# Patient Record
Sex: FEMALE | Race: BLACK OR AFRICAN AMERICAN | Employment: OTHER | URBAN - METROPOLITAN AREA
[De-identification: names, ages, dates, MRNs, and addresses within clinical notes are randomized per-mention and may not be internally consistent; named-entity substitution may affect disease eponyms.]

---

## 2017-01-31 ENCOUNTER — HOSPITAL ENCOUNTER (OUTPATIENT)
Dept: MAMMOGRAPHY | Age: 72
Discharge: HOME | End: 2017-01-31
Attending: FAMILY MEDICINE
Payer: MEDICARE

## 2017-01-31 DIAGNOSIS — Z12.31 VISIT FOR SCREENING MAMMOGRAM: ICD-10-CM

## 2017-01-31 PROCEDURE — 77063 BREAST TOMOSYNTHESIS BI: CPT

## 2017-12-05 ENCOUNTER — HOSPITAL ENCOUNTER (OUTPATIENT)
Dept: ULTRASOUND IMAGING | Age: 72
Discharge: HOME | End: 2017-12-05
Payer: MEDICARE

## 2017-12-05 DIAGNOSIS — E04.2 NONTOXIC MULTINODULAR GOITER: ICD-10-CM

## 2017-12-05 PROCEDURE — 76536 US EXAM OF HEAD AND NECK: CPT

## 2018-02-05 ENCOUNTER — HOSPITAL ENCOUNTER (OUTPATIENT)
Dept: MAMMOGRAPHY | Age: 73
Discharge: HOME | End: 2018-02-05
Attending: FAMILY MEDICINE
Payer: MEDICARE

## 2018-02-05 DIAGNOSIS — Z12.39 BREAST CANCER SCREENING: ICD-10-CM

## 2018-02-05 PROCEDURE — 77063 BREAST TOMOSYNTHESIS BI: CPT

## 2018-03-05 ENCOUNTER — HOSPITAL ENCOUNTER (INPATIENT)
Age: 73
LOS: 1 days | Discharge: HOME | DRG: 916 | End: 2018-03-06
Attending: EMERGENCY MEDICINE | Admitting: FAMILY MEDICINE
Payer: MEDICARE

## 2018-03-05 DIAGNOSIS — R49.9 CHANGE IN VOICE: ICD-10-CM

## 2018-03-05 DIAGNOSIS — T78.3XXA ANGIOEDEMA, INITIAL ENCOUNTER: Primary | ICD-10-CM

## 2018-03-05 LAB
ABO + RH BLD: NORMAL
ALBUMIN SERPL-MCNC: 3.9 G/DL (ref 3.4–5)
ALBUMIN/GLOB SERPL: 1.3 {RATIO} (ref 0.8–1.7)
ALP SERPL-CCNC: 69 U/L (ref 45–117)
ALT SERPL-CCNC: 27 U/L (ref 13–56)
ANION GAP SERPL CALC-SCNC: 6 MMOL/L (ref 3–18)
ANION GAP SERPL CALC-SCNC: 7 MMOL/L (ref 3–18)
AST SERPL-CCNC: 22 U/L (ref 15–37)
BASOPHILS # BLD: 0 K/UL (ref 0–0.06)
BASOPHILS # BLD: 0 K/UL (ref 0–0.06)
BASOPHILS NFR BLD: 0 % (ref 0–2)
BASOPHILS NFR BLD: 0 % (ref 0–2)
BILIRUB SERPL-MCNC: 0.4 MG/DL (ref 0.2–1)
BLOOD GROUP ANTIBODIES SERPL: NORMAL
BUN SERPL-MCNC: 9 MG/DL (ref 7–18)
BUN SERPL-MCNC: 9 MG/DL (ref 7–18)
BUN/CREAT SERPL: 8 (ref 12–20)
BUN/CREAT SERPL: 9 (ref 12–20)
CALCIUM SERPL-MCNC: 9.3 MG/DL (ref 8.5–10.1)
CALCIUM SERPL-MCNC: 9.7 MG/DL (ref 8.5–10.1)
CHLORIDE SERPL-SCNC: 106 MMOL/L (ref 100–108)
CHLORIDE SERPL-SCNC: 107 MMOL/L (ref 100–108)
CO2 SERPL-SCNC: 29 MMOL/L (ref 21–32)
CO2 SERPL-SCNC: 29 MMOL/L (ref 21–32)
CREAT SERPL-MCNC: 0.99 MG/DL (ref 0.6–1.3)
CREAT SERPL-MCNC: 1.11 MG/DL (ref 0.6–1.3)
DIFFERENTIAL METHOD BLD: ABNORMAL
DIFFERENTIAL METHOD BLD: ABNORMAL
EOSINOPHIL # BLD: 0 K/UL (ref 0–0.4)
EOSINOPHIL # BLD: 0.1 K/UL (ref 0–0.4)
EOSINOPHIL NFR BLD: 0 % (ref 0–5)
EOSINOPHIL NFR BLD: 2 % (ref 0–5)
ERYTHROCYTE [DISTWIDTH] IN BLOOD BY AUTOMATED COUNT: 14.9 % (ref 11.6–14.5)
ERYTHROCYTE [DISTWIDTH] IN BLOOD BY AUTOMATED COUNT: 14.9 % (ref 11.6–14.5)
EST. AVERAGE GLUCOSE BLD GHB EST-MCNC: 151 MG/DL
GLOBULIN SER CALC-MCNC: 3.1 G/DL (ref 2–4)
GLUCOSE BLD STRIP.AUTO-MCNC: 239 MG/DL (ref 70–110)
GLUCOSE SERPL-MCNC: 123 MG/DL (ref 74–99)
GLUCOSE SERPL-MCNC: 233 MG/DL (ref 74–99)
HBA1C MFR BLD: 6.9 % (ref 4.2–5.6)
HCT VFR BLD AUTO: 34.5 % (ref 35–45)
HCT VFR BLD AUTO: 39 % (ref 35–45)
HGB BLD-MCNC: 11.8 G/DL (ref 12–16)
HGB BLD-MCNC: 13.2 G/DL (ref 12–16)
INR PPP: 1 (ref 0.8–1.2)
LYMPHOCYTES # BLD: 1.3 K/UL (ref 0.9–3.6)
LYMPHOCYTES # BLD: 4.2 K/UL (ref 0.9–3.6)
LYMPHOCYTES NFR BLD: 21 % (ref 21–52)
LYMPHOCYTES NFR BLD: 61 % (ref 21–52)
MCH RBC QN AUTO: 28.9 PG (ref 24–34)
MCH RBC QN AUTO: 29.3 PG (ref 24–34)
MCHC RBC AUTO-ENTMCNC: 33.8 G/DL (ref 31–37)
MCHC RBC AUTO-ENTMCNC: 34.2 G/DL (ref 31–37)
MCV RBC AUTO: 85.3 FL (ref 74–97)
MCV RBC AUTO: 85.6 FL (ref 74–97)
MONOCYTES # BLD: 0.1 K/UL (ref 0.05–1.2)
MONOCYTES # BLD: 0.5 K/UL (ref 0.05–1.2)
MONOCYTES NFR BLD: 1 % (ref 3–10)
MONOCYTES NFR BLD: 7 % (ref 3–10)
NEUTS SEG # BLD: 2 K/UL (ref 1.8–8)
NEUTS SEG # BLD: 4.9 K/UL (ref 1.8–8)
NEUTS SEG NFR BLD: 30 % (ref 40–73)
NEUTS SEG NFR BLD: 78 % (ref 40–73)
PLATELET # BLD AUTO: 175 K/UL (ref 135–420)
PLATELET # BLD AUTO: 182 K/UL (ref 135–420)
PMV BLD AUTO: 11.1 FL (ref 9.2–11.8)
PMV BLD AUTO: 11.3 FL (ref 9.2–11.8)
POTASSIUM SERPL-SCNC: 3.4 MMOL/L (ref 3.5–5.5)
POTASSIUM SERPL-SCNC: 3.8 MMOL/L (ref 3.5–5.5)
PROT SERPL-MCNC: 7 G/DL (ref 6.4–8.2)
PROTHROMBIN TIME: 13 SEC (ref 11.5–15.2)
RBC # BLD AUTO: 4.03 M/UL (ref 4.2–5.3)
RBC # BLD AUTO: 4.57 M/UL (ref 4.2–5.3)
SODIUM SERPL-SCNC: 142 MMOL/L (ref 136–145)
SODIUM SERPL-SCNC: 142 MMOL/L (ref 136–145)
SPECIMEN EXP DATE BLD: NORMAL
WBC # BLD AUTO: 6.3 K/UL (ref 4.6–13.2)
WBC # BLD AUTO: 6.8 K/UL (ref 4.6–13.2)

## 2018-03-05 PROCEDURE — 96375 TX/PRO/DX INJ NEW DRUG ADDON: CPT

## 2018-03-05 PROCEDURE — 74011250636 HC RX REV CODE- 250/636: Performed by: EMERGENCY MEDICINE

## 2018-03-05 PROCEDURE — 85610 PROTHROMBIN TIME: CPT | Performed by: EMERGENCY MEDICINE

## 2018-03-05 PROCEDURE — 74011636637 HC RX REV CODE- 636/637: Performed by: FAMILY MEDICINE

## 2018-03-05 PROCEDURE — 74011250637 HC RX REV CODE- 250/637: Performed by: HOSPITALIST

## 2018-03-05 PROCEDURE — 74011000250 HC RX REV CODE- 250: Performed by: FAMILY MEDICINE

## 2018-03-05 PROCEDURE — 86900 BLOOD TYPING SEROLOGIC ABO: CPT | Performed by: EMERGENCY MEDICINE

## 2018-03-05 PROCEDURE — 74011000250 HC RX REV CODE- 250: Performed by: EMERGENCY MEDICINE

## 2018-03-05 PROCEDURE — 65660000000 HC RM CCU STEPDOWN

## 2018-03-05 PROCEDURE — 80053 COMPREHEN METABOLIC PANEL: CPT | Performed by: FAMILY MEDICINE

## 2018-03-05 PROCEDURE — 94761 N-INVAS EAR/PLS OXIMETRY MLT: CPT

## 2018-03-05 PROCEDURE — 96374 THER/PROPH/DIAG INJ IV PUSH: CPT

## 2018-03-05 PROCEDURE — 82962 GLUCOSE BLOOD TEST: CPT

## 2018-03-05 PROCEDURE — 99284 EMERGENCY DEPT VISIT MOD MDM: CPT

## 2018-03-05 PROCEDURE — 36415 COLL VENOUS BLD VENIPUNCTURE: CPT | Performed by: FAMILY MEDICINE

## 2018-03-05 PROCEDURE — 77030011256 HC DRSG MEPILEX <16IN NO BORD MOLN -A

## 2018-03-05 PROCEDURE — 83036 HEMOGLOBIN GLYCOSYLATED A1C: CPT | Performed by: FAMILY MEDICINE

## 2018-03-05 PROCEDURE — 94640 AIRWAY INHALATION TREATMENT: CPT

## 2018-03-05 PROCEDURE — P9059 PLASMA, FRZ BETWEEN 8-24HOUR: HCPCS | Performed by: EMERGENCY MEDICINE

## 2018-03-05 PROCEDURE — 80048 BASIC METABOLIC PNL TOTAL CA: CPT | Performed by: EMERGENCY MEDICINE

## 2018-03-05 PROCEDURE — 36430 TRANSFUSION BLD/BLD COMPNT: CPT

## 2018-03-05 PROCEDURE — 30233K1 TRANSFUSION OF NONAUTOLOGOUS FROZEN PLASMA INTO PERIPHERAL VEIN, PERCUTANEOUS APPROACH: ICD-10-PCS | Performed by: EMERGENCY MEDICINE

## 2018-03-05 PROCEDURE — 85025 COMPLETE CBC W/AUTO DIFF WBC: CPT | Performed by: EMERGENCY MEDICINE

## 2018-03-05 RX ORDER — AMLODIPINE BESYLATE 5 MG/1
10 TABLET ORAL DAILY
Status: DISCONTINUED | OUTPATIENT
Start: 2018-03-06 | End: 2018-03-06 | Stop reason: HOSPADM

## 2018-03-05 RX ORDER — GABAPENTIN 600 MG/1
600 TABLET ORAL DAILY
COMMUNITY

## 2018-03-05 RX ORDER — SODIUM CHLORIDE 9 MG/ML
250 INJECTION, SOLUTION INTRAVENOUS AS NEEDED
Status: DISCONTINUED | OUTPATIENT
Start: 2018-03-05 | End: 2018-03-06 | Stop reason: HOSPADM

## 2018-03-05 RX ORDER — CLONAZEPAM 0.5 MG/1
0.5 TABLET ORAL 2 TIMES DAILY
COMMUNITY

## 2018-03-05 RX ORDER — INSULIN LISPRO 100 [IU]/ML
INJECTION, SOLUTION INTRAVENOUS; SUBCUTANEOUS
Status: DISCONTINUED | OUTPATIENT
Start: 2018-03-05 | End: 2018-03-06 | Stop reason: HOSPADM

## 2018-03-05 RX ORDER — INSULIN LISPRO 100 [IU]/ML
INJECTION, SOLUTION INTRAVENOUS; SUBCUTANEOUS EVERY 6 HOURS
Status: DISCONTINUED | OUTPATIENT
Start: 2018-03-05 | End: 2018-03-05

## 2018-03-05 RX ORDER — ZOLPIDEM TARTRATE 5 MG/1
10 TABLET ORAL
Status: DISCONTINUED | OUTPATIENT
Start: 2018-03-05 | End: 2018-03-06 | Stop reason: HOSPADM

## 2018-03-05 RX ORDER — DIPHENHYDRAMINE HYDROCHLORIDE 50 MG/ML
50 INJECTION, SOLUTION INTRAMUSCULAR; INTRAVENOUS
Status: COMPLETED | OUTPATIENT
Start: 2018-03-05 | End: 2018-03-05

## 2018-03-05 RX ORDER — CELECOXIB 200 MG/1
200 CAPSULE ORAL DAILY
COMMUNITY

## 2018-03-05 RX ORDER — ENOXAPARIN SODIUM 100 MG/ML
40 INJECTION SUBCUTANEOUS EVERY 24 HOURS
Status: DISCONTINUED | OUTPATIENT
Start: 2018-03-05 | End: 2018-03-06 | Stop reason: HOSPADM

## 2018-03-05 RX ORDER — FAMOTIDINE 10 MG/ML
20 INJECTION INTRAVENOUS
Status: COMPLETED | OUTPATIENT
Start: 2018-03-05 | End: 2018-03-05

## 2018-03-05 RX ORDER — TERBINAFINE HYDROCHLORIDE 250 MG/1
250 TABLET ORAL DAILY
COMMUNITY

## 2018-03-05 RX ORDER — POTASSIUM CHLORIDE 7.45 MG/ML
10 INJECTION INTRAVENOUS
Status: COMPLETED | OUTPATIENT
Start: 2018-03-05 | End: 2018-03-05

## 2018-03-05 RX ORDER — SODIUM CHLORIDE AND POTASSIUM CHLORIDE .9; .15 G/100ML; G/100ML
SOLUTION INTRAVENOUS CONTINUOUS
Status: DISCONTINUED | OUTPATIENT
Start: 2018-03-05 | End: 2018-03-05

## 2018-03-05 RX ORDER — MAGNESIUM SULFATE 100 %
4 CRYSTALS MISCELLANEOUS AS NEEDED
Status: DISCONTINUED | OUTPATIENT
Start: 2018-03-05 | End: 2018-03-06 | Stop reason: HOSPADM

## 2018-03-05 RX ORDER — DEXTROSE 50 % IN WATER (D50W) INTRAVENOUS SYRINGE
25-50 AS NEEDED
Status: DISCONTINUED | OUTPATIENT
Start: 2018-03-05 | End: 2018-03-06 | Stop reason: HOSPADM

## 2018-03-05 RX ADMIN — METHYLPREDNISOLONE SODIUM SUCCINATE 125 MG: 125 INJECTION, POWDER, FOR SOLUTION INTRAMUSCULAR; INTRAVENOUS at 04:21

## 2018-03-05 RX ADMIN — POTASSIUM CHLORIDE 10 MEQ: 7.46 INJECTION, SOLUTION INTRAVENOUS at 07:23

## 2018-03-05 RX ADMIN — FAMOTIDINE 20 MG: 10 INJECTION, SOLUTION INTRAVENOUS at 18:30

## 2018-03-05 RX ADMIN — INSULIN LISPRO 4 UNITS: 100 INJECTION, SOLUTION INTRAVENOUS; SUBCUTANEOUS at 18:30

## 2018-03-05 RX ADMIN — ZOLPIDEM TARTRATE 10 MG: 5 TABLET ORAL at 22:54

## 2018-03-05 RX ADMIN — FAMOTIDINE 20 MG: 10 INJECTION, SOLUTION INTRAVENOUS at 04:21

## 2018-03-05 RX ADMIN — DIPHENHYDRAMINE HYDROCHLORIDE 50 MG: 50 INJECTION, SOLUTION INTRAMUSCULAR; INTRAVENOUS at 04:20

## 2018-03-05 RX ADMIN — RACEPINEPHRINE HYDROCHLORIDE 0.5 ML: 11.25 SOLUTION RESPIRATORY (INHALATION) at 04:30

## 2018-03-05 NOTE — PROGRESS NOTES
[facility]   Discharge Planning/ Assessment    Reasons for Intervention: Chart reviewed and pt verified demographics. Her daughter Carey Story [phone] is her emergency contact. A gentleman was in her room who identified himself as her son. She asked him to wait outside the room while she talked with me. She did not want to add this son to her list of contacts. She has [place] part a and [place], and [place] for insurance. Dr Lisset Garcia is her PCP and she saw him last month. She goes to a pain specialist and was due for tomorrow. She will reschedule that meeting and says she will get a shot for pain when she goes. She had received Home Health Pt aboout 2 years ago for her knee surgery. She currently goes to [facility] on [address] Tuesday and Fridays by bus for therapy. She has a nebulizer, walker, wheel chair and cane for mobility. She says she is having a harder time getting things done such as shopping and chores. Her transition plan is home, with her dtr or her friend to transport. If she continues to have issues with her shopping and chores, she will discuss with Dr Dana Vann.       High Risk Criteria  [x] Yes  []No   Physician Referral  [] Yes  []No        Date    Nursing Referral  [] Yes  []No        Date    Patient/Family Request  [] Yes  []No        Date       Resources:    Medicare  [x] Yes  []No   Medicaid  [x] Yes  []No   No Resources  [] Yes  []No   Private Insurance  [] Yes  []No    Name/Phone Number    Other  [] Yes  []No        (i.e. Workman's Comp)         Prior Services:    Prior Services  [] Yes  []No   Home Health  [x] Yes  []No   [address]  [] Yes  []No        Number of [address]  [] Yes  []No       Meals on Wheels  [] Yes  []No   Office on Aging  [] Yes  []No   Transportation Services  [] Yes  []No   [address]  [] Yes  []No        Nursing Home Name    [address]  [] Yes  []No        [address] Name    Other Information Source:      Information obtained from  [x] Patient  [] Parent   [] Guardian  [] Child  [] Spouse   [] Significant Other/Partner   [] Friend      [] EMS    [] Nursing Home Chart          [] Other:   Chart Review  [x] Yes  []No     Family/Support System:    Patient lives with  [x] Alone    [] Spouse   [] Significant Other  [] Children  [] Caretaker   [] Parent  [] Sibling     [] Other       Other Support System:    Is the patient responsible for care of others  [] Yes  [x]No   Information of person caring for patient on  discharge    Managers financial affairs independently  [x] Yes  []No   If no, explain:      Status Prior to Admission:    Mental Status  [x] Awake  [] Alert  [] Oriented  [] Quiet/Calm [] Lethargic/Sedated   [] Disoriented  [] Restless/Anxious  [] Combative   Personal Care  [] Dependent  [x] [address]  [] Requires Assistance   Meal Preparation Ability  [x] Independent   [] Standby Assistance   [] Minimal Assistance   [] Moderate Assistance  [] Maximum Assistance     [] Total Assistance   Chores  [x] Independent with Chores   [] N/A Nursing Home Resident   [] Requires Assistance   Bowel/Bladder  [x] Continent  [] Catheter  [] Incontinent  [] Ostomy Self-Care    [] Urine Diversion Self-Care  [] Maximum Assistance     [] Total Assistance   Number of Persons needed for assistance    DME at home  [] [address] [place] [address], Ne, Devonda Andra  [] [address] [place] [address], Straight   [] Commode    [] Bathroom/Grab Bars  [] Hospital Bed  [] Nebulizer  [] Oxygen           [] Raised Toilet Seat  [] Shower Chair  [] Side Rails for Bed   [] Tub Transfer Bench   [] [place]  [] Joey [place], Standard      [] Other:   Vendor      Treatment Presently Receiving:    Current Treatments  [] Chemotherapy  [] Dialysis  [] Insulin  [] IVAB [x] IVF   [] O2  [] PCA   [] PT   [] RT   [] Tube Feedings   [] Wound Care     Psychosocial Evaluation:    Verbalized Knowledge of Disease Process  [] Patient  []Family   Coping with Disease Process  [] Patient  []Family   Requires Further Counseling Coping with Disease Process  [] Patient  []Family     Identified Projected Needs:    Home Health Aid  [] Yes  []No   Transportation  [] Yes  []No   Education  [] Yes  []No        Specific Education     Financial Counseling  [] Yes  []No   Inability to Care for Self/Will Require 24 hour care  [] Yes  []No   Pain Management  [] Yes  []No   Home Infusion Therapy  [] Yes  []No   Oxygen Therapy  [] Yes  []No   DME  [] Yes  []No   Long Term Care Placement  [] Yes  []No   Rehab  [] Yes  []No   Physical Therapy  [] Yes  []No   Needs Anticipated At This Time  [] Yes  [x]No     Intra-Hospital Referral:    [address] [facility]  [] Yes  []No     [] Yes  []No   Patient Representative  [] Yes  []No   Staff for Teaching Needs  [] Yes  []No   Specialty Teaching Needs     Diabetic Educator  [] Yes  []No   Referral for Diabetic Educator Needed  [] Yes  []No  If Yes, place order for Nutritionist or Diabetic Consult     Tentative Discharge Plan:    Home with No Services  [x] Yes  []No   Home with Home Health Follow-up  [] Yes  []No        If Yes, specify type    Home Care Program  [] Yes  []No        If Yes, specify type    Meals on Wheels  [] Yes  []No   Office of Aging  [] Yes  []No   NHP  [] Yes  []No   Return to the Nursing Home  [] Yes  []No   Rehab Therapy  [] Yes  []No   Acute Rehab  [] Yes  []No   Subacute Rehab  [] Yes  []No   Private Care  [] Yes  []No   Substance Abuse Referral  [] Yes  []No   Transportation  [] Yes  []No   Chore Service  [] Yes  []No   Inpatient Hospice  [] Yes  []No   OP RT  [] Yes  [] No   OP Hemo  [] Yes  [] No   OP PT  [] Yes  []No   Support Group  [] Yes  []No   Reach to Recovery  [] Yes  []No   OP Oncology Clinic  [] Yes  []No   Clinic Appointment  [] Yes  []No   DME  [] Yes  []No   Comments    Name of D/C Planner or  Given to Patient or Family [facility].  [facility] RN   Phone Number         Extension [phone]   Date 03/05/18   Time    If you are discharged home, whom do you designate to participate in your discharge plan and receive any information needed?      Enter name of designee dtr        Phone # of designee         Address of designee         Updated         Patient refused to designate any           individual

## 2018-03-05 NOTE — PROGRESS NOTES
completed the initial Spiritual Assessment of the patient in bed 6 of the emergency room and offered Pastoral Care support to her and family members present. Patient does not have any Church/cultural needs that will affect patients preferences in health care.  Chaplains will continue to follow and will provide pastoral care on an as needed/requested basis     Chaplain Son Oh   Board Certified [address] [place] [address]   [phone]

## 2018-03-05 NOTE — ACP (ADVANCE CARE PLANNING)
Patient has designated ______a daughter or her friend __________________ to participate in his/her discharge plan and to receive any needed information. Name: one daughter is Laurent Zaldivar [phone] and pt wants to keep her as emergency contact.    Address:  Phone number:

## 2018-03-05 NOTE — ED NOTES
Received bedside report, patient resting in the bed, on the cardiac monitor, family at the bedside, night nurse to call report and then patient will go upstairs, Patient is A&O x 4

## 2018-03-05 NOTE — ED NOTES
Lip swelling is greatly improved from time of admission. Patient continues to denies SOB, difficulty swallowing, denies throat tightness.

## 2018-03-05 NOTE — ED TRIAGE NOTES
Patient awoke with swelling of lips and tongue. Has change of voice. Denies difficulty breathing at this time.

## 2018-03-05 NOTE — IP AVS SNAPSHOT
[address] [address] 
[phone] Patient: Jackie Amaya MRN: [id number] :1945 About your hospitalization You were admitted on:  2018 You last received care in the:  [address] [place] [address] You were discharged on:  2018 Why you were hospitalized Your primary diagnosis was: Angioedema Follow-up Information Follow up With Details Comments Contact Info Morenita Lynne MD  Follow up with PCP within 7 days of discharge Chantel [address] [id number] 
[phone] Discharge Orders None A check maria indicates which time of day the medication should be taken. My Medications CONTINUE taking these medications Instructions Each Dose to Equal  
 Morning Noon Evening Bedtime  
 allopurinol 300 mg tablet Commonly known as:  Yovani Nogueira Your last dose was: Your next dose is: Take 1 Tab by mouth daily. 300 mg  
    
   
   
   
  
 amLODIPine 10 mg tablet Commonly known as:  Nanette Anderson Your last dose was: Your next dose is: Take 1 Tab by mouth daily. 10 mg  
    
   
   
   
  
 celecoxib 200 mg capsule Commonly known as:  CELEBREX Your last dose was: Your next dose is: Take 200 mg by mouth daily. 200 mg  
    
   
   
   
  
 clonazePAM 0.5 mg tablet Commonly known as:  Tiney Player Your last dose was: Your next dose is: Take 0.5 mg by mouth two (2) times a day. 0.5 mg  
    
   
   
   
  
 cyclobenzaprine 10 mg tablet Commonly known as:  FLEXERIL Your last dose was: Your next dose is: Take 1 Tab by mouth two (2) times a day. 10 mg  
    
   
   
   
  
 gabapentin 600 mg tablet Commonly known as:  NEURONTIN Your last dose was: Your next dose is: Take 600 mg by mouth daily.   
 600 mg  
    
 HYDROmorphone 2 mg tablet Commonly known as:  DILAUDID Your last dose was: Your next dose is: Take 1 Tab by mouth every four (4) hours as needed. Max Daily Amount: 12 mg.  
 2 mg  
    
   
   
   
  
 metFORMIN 500 mg tablet Commonly known as:  GLUCOPHAGE Your last dose was: Your next dose is: Take 500 mg by mouth every evening. 500 mg  
    
   
   
   
  
 pantoprazole 40 mg tablet Commonly known as:  PROTONIX Your last dose was: Your next dose is: Take 1 Tab by mouth Daily (before breakfast). 40 mg  
    
   
   
   
  
 simvastatin 10 mg tablet Commonly known as:  ZOCOR Your last dose was: Your next dose is: Take 1 Tab by mouth nightly. 10 mg  
    
   
   
   
  
 terbinafine HCl 250 mg tablet Commonly known as:  LAMISIL Your last dose was: Your next dose is: Take 250 mg by mouth daily. 250 mg  
    
   
   
   
  
 therapeutic multivitamin tablet Commonly known as:  [facility] Your last dose was: Your next dose is: Take 1 Tab by mouth daily. 1 Tab  
    
   
   
   
  
 traMADol 50 mg tablet Commonly known as:  ULTRAM  
   
Your last dose was: Your next dose is: Take 50 mg by mouth every six (6) hours as needed for Pain. 50 mg  
    
   
   
   
  
 zolpidem 10 mg tablet Commonly known as:  AMBIEN Your last dose was: Your next dose is: Take 1 Tab by mouth nightly. Max Daily Amount: 10 mg.  
 10 mg  
    
   
   
   
  
  
STOP taking these medications   
 quinapril 40 mg tablet Commonly known as:  ACCUPRIL Discharge Instructions Allergic Reaction: Care Instructions Your Care Instructions An allergic reaction is an excessive response from your immune system to a medicine, chemical, food, insect bite, or other substance. A reaction can range from mild to life-threatening. Some people have a mild rash, hives, and itching or stomach cramps. In severe reactions, swelling of your tongue and throat can close up your airway so that you cannot breathe. Follow-up care is a key part of your treatment and safety. Be sure to make and go to all appointments, and call your doctor if you are having problems. It's also a good idea to know your test results and keep a list of the medicines you take. How can you care for yourself at home? · If you know what caused your allergic reaction, be sure to avoid it. Your allergy may become more severe each time you have a reaction. · Take an over-the-counter antihistamine, such as cetirizine (Zyrtec) or loratadine (Claritin), to treat mild symptoms. Read and follow directions on the label. Some antihistamines can make you feel sleepy. Do not give antihistamines to a child unless you have checked with your doctor first. Mild symptoms include sneezing or an itchy or runny nose; an itchy mouth; a few hives or mild itching; and mild nausea or stomach discomfort. · Do not scratch hives or a rash. Put a cold, moist towel on them or take cool baths to relieve itching. Put ice packs on hives, swelling, or insect stings for 10 to 15 minutes at a time. Put a thin cloth between the ice pack and your skin. Do not take hot baths or showers. They will make the itching worse. · Your doctor may prescribe a shot of epinephrine to carry with you in case you have a severe reaction. Learn how to give yourself the shot and keep it with you at all times. Make sure it is not . · Go to the emergency room every time you have a severe reaction, even if you have used your shot of epinephrine and are feeling better. Symptoms can come back after a shot. · Wear medical alert jewelry that lists your allergies. You can buy this at most [employer]. · If your child has a severe allergy, make sure that his or her teachers, babysitters, coaches, and other caregivers know about the allergy. They should have an epinephrine shot, know how and when to give it, and have a plan to take your child to the hospital. 
When should you call for help? Give an epinephrine shot if: 
? · You think you are having a severe allergic reaction. ? · You have symptoms in more than one body area, such as mild nausea and an itchy mouth. ? After giving an epinephrine shot call 911, even if you feel better. ?Call 911 if: 
? · You have symptoms of a severe allergic reaction. These may include: 
¨ Sudden raised, red areas (hives) all over your body. ¨ Swelling of the throat, mouth, lips, or tongue. ¨ Trouble breathing. ¨ Passing out (losing consciousness). Or you may feel very lightheaded or suddenly feel weak, confused, or restless. ? · You have been given an epinephrine shot, even if you feel better. ?Call your doctor now or seek immediate medical care if: 
? · You have symptoms of an allergic reaction, such as: ¨ A rash or hives (raised, red areas on the skin). ¨ Itching. ¨ Swelling. ¨ Belly pain, nausea, or vomiting. ? Watch closely for changes in your health, and be sure to contact your doctor if: 
? · You do not get better as expected. Where can you learn more? Go to http://alanis-mann.info/. Enter T547 in the search box to learn more about \"Allergic Reaction: Care Instructions. \" Current as of: September 29, 2016 Content Version: 11.4 © 5177-1732 [employer]. Care instructions adapted under license by [employer] (which disclaims liability or warranty for this information). If you have questions about a medical condition or this instruction, always ask your healthcare professional. [address] any warranty or liability for your use of this information. Angioedema: Care Instructions Your Care Instructions Angioedema is an allergic reaction. It causes swelling and welts in the deep layers of the skin. Angioedema can sometimes occur along with hives. Hives are an allergic reaction in the outer layers of the skin. Angioedema can range from mild to severe. Painful welts can develop on the face. Angioedema can also occur on other parts of the body. In severe cases, the inside of the throat can swell and make it hard to breathe. Many things can cause this condition, including foods, insect bites, and medicines (such as aspirin and some blood pressure medicines). It also can run in families. Sometimes you may know what caused the reaction, but other times you may not know. Follow-up care is a key part of your treatment and safety. Be sure to make and go to all appointments, and call your doctor if you are having problems. It's also a good idea to know your test results and keep a list of the medicines you take. How can you care for yourself at home? · Take your medicines exactly as prescribed. Call your doctor if you think you are having a problem with your medicine. You will get more details on the specific medicines your doctor prescribes. Some medicines used to treat angioedema can make you too sleepy to drive safely. Do not drive if you take medicine that may make you sleepy. · Avoid foods or medicine that may have triggered the swelling. · For comfort: ¨ Try taking a cool bath. Or place a cool, wet towel on the swollen area. ¨ Avoid hot baths and showers. ¨ Wear loose clothing. · Your doctor may prescribe a shot of epinephrine to carry with you in case you have a severe reaction. Learn how to give yourself the shot and keep it with you at all times. Make sure it has not . When should you call for help? Give an epinephrine shot if: 
? · You think you are having a severe allergic reaction. ? After giving an epinephrine shot call 911, even if you feel better. ?Call 911 if: ? · You have symptoms of a severe allergic reaction. These may include: 
¨ Sudden raised, red areas (hives) all over your body. ¨ Swelling of the throat, mouth, lips, or tongue. ¨ Trouble breathing. ¨ Passing out (losing consciousness). Or you may feel very lightheaded or suddenly feel weak, confused, or restless. ? · You have been given an epinephrine shot, even if you feel better. ?Call your doctor now or seek immediate medical care if: 
? · You have symptoms of an allergic reaction, such as: ¨ A rash or hives (raised, red areas on the skin). ¨ Itching. ¨ Swelling. ¨ Belly pain, nausea, or vomiting. ? Watch closely for changes in your health, and be sure to contact your doctor if: 
? · You do not get better as expected. Where can you learn more? Go to http://alanis490 Entertainmentmann.info/. Enter Y305 in the search box to learn more about \"Angioedema: Care Instructions. \" Current as of: September 29, 2016 Content Version: 11.4 © 3908-0755 [employer]. Care instructions adapted under license by [employer] (which disclaims liability or warranty for this information). If you have questions about a medical condition or this instruction, always ask your healthcare professional. [address] any warranty or liability for your use of this information. DISCHARGE SUMMARY from Nurse PATIENT INSTRUCTIONS: 
 
 
F-face looks uneven A-arms unable to move or move unevenly S-speech slurred or non-existent T-time-call 911 as soon as signs and symptoms begin-DO NOT go  
 Back to bed or wait to see if you get better-TIME IS BRAIN. Warning Signs of HEART ATTACK Call 911 if you have these symptoms: 
? Chest discomfort. Most heart attacks involve discomfort in the center of the chest that lasts more than a few minutes, or that goes away and comes back. It can feel like uncomfortable pressure, squeezing, fullness, or pain. ? Discomfort in other areas of the upper body. Symptoms can include pain or discomfort in one or both arms, the back, neck, jaw, or stomach. ? Shortness of breath with or without chest discomfort. ? Other signs may include breaking out in a cold sweat, nausea, or lightheadedness. Don't wait more than five minutes to call [address]! Fast action can save your life. Calling 911 is almost always the fastest way to get lifesaving treatment. Emergency Medical Services staff can begin treatment when they arrive  up to an hour sooner than if someone gets to the hospital by car. The discharge information has been reviewed with the patient. The patient verbalized understanding. Discharge medications reviewed with the patient and appropriate educational materials and side effects teaching were provided. ___________________________________________________________________________________________________________________________________ Introducing [facility] SERVICES! Sharonda Francisco introduces SureFire patient portal. Now you can access parts of your medical record, email your doctor's office, and request medication refills online. 1. In your internet browser, go to [email or web address] 2. Click on the First Time User? Click Here link in the Sign In box. You will see the New Member Sign Up page. 3. Enter your SureFire Access Code exactly as it appears below. You will not need to use this code after youve completed the sign-up process. If you do not sign up before the expiration date, you must request a new code. · ScaleDB Access Code: [email or web address] Expires: 6/4/2018 12:04 PM 
 
4. Enter the last four digits of your Social Security Number (xxxx) and Date of Birth (mm/dd/yyyy) as indicated and click Submit. You will be taken to the next sign-up page. 5. Create a MoMelan Techn[employer] ID. This will be your ScaleDB login ID and cannot be changed, so think of one that is secure and easy to remember. 6. Create a ScaleDB password. You can change your password at any time. 7. Enter your Password Reset Question and Answer. This can be used at a later time if you forget your password. 8. Enter your e-mail address. You will receive e-mail notification when new information is available in [address] 9. Click Sign Up. You can now view and download portions of your medical record. 10. Click the Download Summary menu link to download a portable copy of your medical information. If you have questions, please visit the Frequently Asked Questions section of the ScaleDB website. Remember, ScaleDB is NOT to be used for urgent needs. For medical emergencies, dial 911. Now available from your iPhone and Android! Providers Seen During Your Hospitalization Provider Specialty Primary office phone Zane Romero MD Emergency Medicine [phone] Demetris Navarro MD Internal Medicine [phone] Zahraa Gross MD Family Practice [phone] Vikash Bush MD Internal Medicine [phone] Lopez Purvis DO Internal Medicine [phone] Your Primary Care Physician (PCP) Primary Care Physician Office Phone Office Fax Merlene Navarro [phone] You are allergic to the following Allergen Reactions Oxycodone Itching Tylenol-Codeine #3 (Acetaminophen-Codeine) Itching Recent Documentation Weight Breastfeeding? BMI OB Status Smoking Status 104.6 kg No 34.05 kg/m2 Postmenopausal Passive Smoke Exposure - Never Smoker Emergency Contacts Name Discharge Info Relation Home Work Mobile Quang Villavicencio CAREGIVER [3] Daughter [21] [phone] Patient Belongings The following personal items are in your possession at time of discharge: 
  Dental Appliances: None  Visual Aid: None      Home Medications: None   Jewelry: None  Clothing: Footwear, Pants, Shirt    Other Valuables: None  Personal Items Sent to Safe: n/a Please provide this summary of care documentation to your next provider. Signatures-by signing, you are acknowledging that this After Visit Summary has been reviewed with you and you have received a copy. Patient Signature:  ____________________________________________________________ Date:  ____________________________________________________________  
  
[facility] Provider Signature:  ____________________________________________________________ Date:  ____________________________________________________________

## 2018-03-05 NOTE — ED PROVIDER NOTES
EMERGENCY DEPARTMENT HISTORY AND PHYSICAL EXAM    4:20 AM      Date: 3/5/2018  Patient Name: Rsa Díaz    History of Presenting Illness     Chief Complaint   Patient presents with    Angioedema         History Provided By: Patient    Chief Complaint: Lips swelling   Duration:  Hours  Timing:  Acute  Location: Both lips   Quality: N/A  Severity: Severe  Modifying Factors: None reported   Associated Symptoms:  tongue swelling and voice change. Additional History (Context): Ras Díaz is a 71 y.o. female with PMHx of arthritis, HTN, Gout, GERD, diabetes and enlarged thyroid who presents to the ED with c/o acute severe lips swelling which started this morning. Reports tongue swelling and voice change. Pt notes she was fine before bed. No medication taken to treat Sx. Notes Hx of breathing tube when she had surgery, reports no complication with it. No fever and other medical problem noted. No further complaint or Sx. PCP: Mio Paris MD    Current Facility-Administered Medications   Medication Dose Route Frequency Provider Last Rate Last Dose    racEPINEPHrine (VAPONEFRIN) 2.25% nebulizer solution  0.5 mL Nebulization NOW Juan Luis Mendiola MD        0.9% sodium chloride infusion 250 mL  250 mL IntraVENous PRN Juan Luis Mendiola MD        0.9% sodium chloride infusion 250 mL  250 mL IntraVENous PRN Juan Luis Mendiola MD        potassium chloride 10 mEq in 100 ml IVPB  10 mEq IntraVENous NOW Juan Luis Mendiola MD         Current Outpatient Prescriptions   Medication Sig Dispense Refill    HYDROmorphone (DILAUDID) 2 mg tablet Take 1 Tab by mouth every four (4) hours as needed. Max Daily Amount: 12 mg. 60 Tab 0    allopurinol (ZYLOPRIM) 300 mg tablet Take 1 Tab by mouth daily. 60 Tab 0    amLODIPine (NORVASC) 10 mg tablet Take 1 Tab by mouth daily. 60 Tab 0    cyclobenzaprine (FLEXERIL) 10 mg tablet Take 1 Tab by mouth two (2) times a day.  60 Tab 0    pantoprazole (PROTONIX) 40 mg tablet Take 1 Tab by mouth Daily (before breakfast). 60 Tab 0    quinapril (ACCUPRIL) 40 mg tablet Take 1 Tab by mouth daily. 60 Tab 0    simvastatin (ZOCOR) 10 mg tablet Take 1 Tab by mouth nightly. 60 Tab 0    zolpidem (AMBIEN) 10 mg tablet Take 1 Tab by mouth nightly. Max Daily Amount: 10 mg. 60 Tab 0    therapeutic multivitamin (THERAGRAN) tablet Take 1 Tab by mouth daily.  metFORMIN (GLUCOPHAGE) 500 mg tablet Take 500 mg by mouth every evening.  traMADol (ULTRAM) 50 mg tablet Take 50 mg by mouth every six (6) hours as needed for Pain. Past History     Past Medical History:  Past Medical History:   Diagnosis Date    Arthritis     Diabetes ([address]     PRE DIABETES    Enlarged thyroid     GERD (gastroesophageal reflux disease)     Gout     Hypertension        Past Surgical History:  Past Surgical History:   Procedure Laterality Date    HX HYSTERECTOMY  1985    HX KNEE REPLACEMENT Left June 5157    complicated with patellar pain    HX ORTHOPAEDIC      right hand, left hand       Family History:  Family History   Problem Relation Age of Onset    Breast Cancer Daughter 44    Breast Cancer Daughter 48       Social History:  Social History   Substance Use Topics    Smoking status: Passive Smoke Exposure - Never Smoker    Smokeless tobacco: Never Used    Alcohol use Yes      Comment: occ       Allergies: Allergies   Allergen Reactions    Oxycodone Itching    Tylenol-Codeine #3 [Acetaminophen-Codeine] Itching         Review of Systems     Review of Systems   Constitutional: Negative for fever. HENT: Positive for voice change. Positive for lips and tongue swelling    All other systems reviewed and are negative. Physical Exam     Visit Vitals    /74    Pulse 70    Temp 98.3 °F (36.8 °C)    Resp 18    SpO2 98%         Physical Exam   Constitutional:   General:  Well-developed, well-nourished, obese, muffled voice. Head:  Normocephalic atraumatic.     Eyes:  Pupils midrange extraocular movements intact. No pallor or conjunctival injection. Nose:  No rhinorrhea, inspection grossly normal.    Ears:  Grossly normal to inspection, no discharge. Mouth:  Mucous membranes moist, full tongue, there is swelling of the lower lips soft and bilateral, no marked swelling of the upper lip. Estela Mague Her voice is muffled she is tolerating her secretions. Neck/Back:  Trachea midline, no asymmetry. Chest:  Grossly normal inspection, symmetric chest rise. Pulmonary:  Clear to auscultation bilaterally no wheezes rhonchi or rales. Cardiovascular:  S1-S2 no murmurs rubs or gallops. Abdomen: Nondistended. Extremities:  Grossly normal to inspection, peripheral pulses intact    Neurologic:  Alert and oriented no appreciable focal neurologic deficit. Skin:  Warm and dry  Psychiatric:  Grossly normal mood and affect. Nursing note reviewed, vital signs reviewed. Diagnostic Study Results     Labs -  Recent Results (from the past 12 hour(s))   CBC WITH AUTOMATED DIFF    Collection Time: 03/05/18  4:19 AM   Result Value Ref Range    WBC 6.8 4.6 - 13.2 K/uL    RBC 4.57 4.20 - 5.30 M/uL    HGB 13.2 12.0 - 16.0 g/dL    HCT 39.0 35.0 - 45.0 %    MCV 85.3 74.0 - 97.0 FL    MCH 28.9 24.0 - 34.0 PG    MCHC 33.8 31.0 - 37.0 g/dL    RDW 14.9 (H) 11.6 - 14.5 %    PLATELET 169 360 - 176 K/uL    MPV 11.3 9.2 - 11.8 FL    NEUTROPHILS 30 (L) 40 - 73 %    LYMPHOCYTES 61 (H) 21 - 52 %    MONOCYTES 7 3 - 10 %    EOSINOPHILS 2 0 - 5 %    BASOPHILS 0 0 - 2 %    ABS. NEUTROPHILS 2.0 1.8 - 8.0 K/UL    ABS. LYMPHOCYTES 4.2 (H) 0.9 - 3.6 K/UL    ABS. MONOCYTES 0.5 0.05 - 1.2 K/UL    ABS. EOSINOPHILS 0.1 0.0 - 0.4 K/UL    ABS.  BASOPHILS 0.0 0.0 - 0.06 K/UL    DF AUTOMATED     METABOLIC PANEL, BASIC    Collection Time: 03/05/18  4:19 AM   Result Value Ref Range    Sodium 142 136 - 145 mmol/L    Potassium 3.4 (L) 3.5 - 5.5 mmol/L    Chloride 107 100 - 108 mmol/L    CO2 29 21 - 32 mmol/L    Anion gap 6 3.0 - 18 mmol/L    Glucose 123 (H) 74 - 99 mg/dL    BUN 9 7.0 - 18 MG/DL    Creatinine 0.99 0.6 - 1.3 MG/DL    BUN/Creatinine ratio 9 (L) 12 - 20      GFR est AA >60 >60 ml/min/1.73m2    GFR est non-AA 55 (L) >60 ml/min/1.73m2    Calcium 9.3 8.5 - 10.1 MG/DL   PROTHROMBIN TIME + INR    Collection Time: 03/05/18  4:19 AM   Result Value Ref Range    Prothrombin time 13.0 11.5 - 15.2 sec    INR 1.0 0.8 - 1.2     TYPE & SCREEN    Collection Time: 03/05/18  4:40 AM   Result Value Ref Range    Crossmatch Expiration 03/08/2018     ABO/Rh(D) Raynaldo Fuse POSITIVE     Antibody screen NEG    FFP, ALLOCATE    Collection Time: 03/05/18  4:45 AM   Result Value Ref Range    Unit number [id number]     Blood component type FP 24h, Thaw     Unit division 00     Status of unit ISSUED     Unit number [id number]     Blood component type FP 24h, Thaw     Unit division 00     Status of unit ALLOCATED        Radiologic Studies -   No orders to display         Medical Decision Making   I am the first provider for this patient. I reviewed the vital signs, available nursing notes, past medical history, past surgical history, family history and social history. Vital Signs-Reviewed the patient's vital signs. ED course:  Patient presents with sudden onset of lower lip swelling change in voice primary concern for angioedema EMR reviewed she is on a ACE inhibitor. She has a suspected posterior involvement most likely vocal cords given her change in voice. We'll consider intubation early, trial of racemic epi Solu-Medrol and Benadryl Pepcid    Patient reevaluated after racemic epi and medications, she still has difficulty starting her speech, still has muffled voice     Consult:  Discussed care with Bryant Emery. Standard discussion; including history of patients chief complaint, available diagnostic results, and treatment course. we'll consult attending for awake intubation, she requested 2 units of FFP    Consent:   It was deemed medically necessary to perform transfusion. The patient was informed about the risks benefits and alternatives for the procedure. I answered all questions to the best of my ability. The patient elected to proceed with the procedure. Verbal and written consent was obtained. Patient's presentation, history, physical exam and laboratory evaluations were reviewed. I felt the patient would benefit from inpatient management and treatment. Consult:  Discussed care with Dr. Luis A Haynes. Standard discussion; including history of patients chief complaint, available diagnostic results, and treatment course. Patient was accepted to their service. Consult:  Discussed care with Dr. Arturo Barnard. Standard discussion; including history of patients chief complaint, available diagnostic results, and treatment course    Patient has been evaluated in person by anesthesia, feels reasonable to trial FFP able to be intubated later time in ICU      Patient reevaluated 0619 hrs.: Speaking in a more clear voice    Disposition:    Admitted to medicine service      Portions of this chart were created with Dragon medical speech to text program.   Unrecognized errors may be present. .  I have spent 45 minutes of critical care time (excluding time for other separate services) involved in lab review, consultations with specialist, family decision-making, and documentation. During this entire length of time I was immediately available to the patient. Critical Care: The reason for providing this level of medical care for this critically ill patient was due a critical illness that impaired one or more vital organ systems such that there was a high probability of imminent or life threatening deterioration in the patients condition.  This care involved high complexity decision making to assess, manipulate, and support vital system functions, to treat this degree of vital organ system failure and to prevent further life threatening deterioration of the patients condition. Follow-up Information     None           Patient's Medications   Start Taking    No medications on file   Continue Taking    ALLOPURINOL (ZYLOPRIM) 300 MG TABLET    Take 1 Tab by mouth daily. AMLODIPINE (NORVASC) 10 MG TABLET    Take 1 Tab by mouth daily. CYCLOBENZAPRINE (FLEXERIL) 10 MG TABLET    Take 1 Tab by mouth two (2) times a day. HYDROMORPHONE (DILAUDID) 2 MG TABLET    Take 1 Tab by mouth every four (4) hours as needed. Max Daily Amount: 12 mg. METFORMIN (GLUCOPHAGE) 500 MG TABLET    Take 500 mg by mouth every evening. PANTOPRAZOLE (PROTONIX) 40 MG TABLET    Take 1 Tab by mouth Daily (before breakfast). QUINAPRIL (ACCUPRIL) 40 MG TABLET    Take 1 Tab by mouth daily. SIMVASTATIN (ZOCOR) 10 MG TABLET    Take 1 Tab by mouth nightly. THERAPEUTIC MULTIVITAMIN (THERAGRAN) TABLET    Take 1 Tab by mouth daily. TRAMADOL (ULTRAM) 50 MG TABLET    Take 50 mg by mouth every six (6) hours as needed for Pain. ZOLPIDEM (AMBIEN) 10 MG TABLET    Take 1 Tab by mouth nightly. Max Daily Amount: 10 mg. These Medications have changed    No medications on file   Stop Taking    No medications on file     _______________________________    Attestations:  [address] [place] [address] acting as a scribe for and in the presence of Kendall Rinaldi MD      March 05, 2018 at 4:20 AM       Provider Attestation:      I personally performed the services described in the documentation, reviewed the documentation, as recorded by the scribe in my presence, and it accurately and completely records my words and actions.  March 05, 2018 at 4:20 AM - Kendall Rinaldi MD    _______________________________

## 2018-03-05 NOTE — PROGRESS NOTES
1000-  Assumed care. Report received from ED nurse at bedside. Orders, notes, labs, meds and plan of are reviewed. Pt a/O x 3. NAD. No evidence of agioedema present. Currently transfusing 2/2 FFP with no complications noted. Pt denies pain or complaint. Assessment completed. 1200-  Pt awake and without pain or complaint. Denies swelling in mouth or tongue. No dyspnea, SOB or wheezing noted. VS stable. Remains NPO due to possibility of need for awake intubation. Pt acknowledges understanding of NPO status.

## 2018-03-05 NOTE — Clinical Note
Status[de-identified] Inpatient [101] Type of Bed: Intensive Care [6] Inpatient Hospitalization Certified Necessary for the Following Reasons: 3. Patient receiving treatment that can only be provided in an inpatient setting (further clarification in H&P documentation) Admitting Diagnosis: Angioedema [431197] Admitting Physician: Oc Serna Attending Physician: Oc Serna Estimated Length of Stay: 2 Midnights Discharge Plan[de-identified] Home with Office Follow-up

## 2018-03-05 NOTE — ED NOTES
Patient brought to the ICU with this nurse, Karishma Hull was the receiving nurse in ICU, patient on cardiac monitor for transport, patient ambulatory to the bed.

## 2018-03-05 NOTE — PROGRESS NOTES
Entered into this chart for admission purposes to the ICU. ALFONSO is the primary nurse for this patient. Awaiting his call to get report prior to patient coming into room 5541.    2919  Spoke with Alfonso in the ER. Patient will probably come up to the ICU after shift change (7am). FFP is infusing now, will attempt to obtain a second IV and give potassium prior to bringing her to her room. Per Coral Rounds no signs of difficulty breathing or chest tightness. 0740  Although patient has not arrived on ICU unit yet, SBAR report has been given to incoming RN Jay Amaya that will have this patient today which she comes up from the ER.

## 2018-03-05 NOTE — H&P
History and Physical    Patient: Aspen Warren               Sex: female          DOA: 3/5/2018       YOB: 1945      Age:  71 y.o.        LOS:  LOS: 0 days        Chief Complaint   Patient presents with    Angioedema         HPI:     Aspen Warren is a 71 y.o. female who presents with angioedema. Patient reports that she woke up this AM with swollen lips , tongue and hoarse voice. She has never had this symptoms before. She denies trauma, urticaria or insects bite. She is on quanipril for htn and has been taking this for 10 years or so. She reports the swelling is increasing. In the ED she was hemodynamically stable. She was given solumedrol, benadryl,pepecid. FFP 2 UNITS pending. ED called Anesthesiology to possibly intubate . Patient will be admitted for angioedema of Lips, tongue ,      Past Medical History:   Diagnosis Date    Arthritis     Diabetes ([address]     PRE DIABETES    Enlarged thyroid     GERD (gastroesophageal reflux disease)     Gout     Hypertension    . Past Surgical History:   Procedure Laterality Date    HX HYSTERECTOMY  1985    HX KNEE REPLACEMENT Left June 8504    complicated with patellar pain    HX ORTHOPAEDIC      right hand, left hand       No current facility-administered medications on file prior to encounter. Current Outpatient Prescriptions on File Prior to Encounter   Medication Sig Dispense Refill    HYDROmorphone (DILAUDID) 2 mg tablet Take 1 Tab by mouth every four (4) hours as needed. Max Daily Amount: 12 mg. 60 Tab 0    allopurinol (ZYLOPRIM) 300 mg tablet Take 1 Tab by mouth daily. 60 Tab 0    amLODIPine (NORVASC) 10 mg tablet Take 1 Tab by mouth daily. 60 Tab 0    cyclobenzaprine (FLEXERIL) 10 mg tablet Take 1 Tab by mouth two (2) times a day. 60 Tab 0    pantoprazole (PROTONIX) 40 mg tablet Take 1 Tab by mouth Daily (before breakfast). 60 Tab 0    quinapril (ACCUPRIL) 40 mg tablet Take 1 Tab by mouth daily.  60 Tab 0    simvastatin (ZOCOR) 10 mg tablet Take 1 Tab by mouth nightly. 60 Tab 0    zolpidem (AMBIEN) 10 mg tablet Take 1 Tab by mouth nightly. Max Daily Amount: 10 mg. 60 Tab 0    therapeutic multivitamin (THERAGRAN) tablet Take 1 Tab by mouth daily.  metFORMIN (GLUCOPHAGE) 500 mg tablet Take 500 mg by mouth every evening.  traMADol (ULTRAM) 50 mg tablet Take 50 mg by mouth every six (6) hours as needed for Pain. Social History     Social History    Marital status:      Spouse name: N/A    Number of children: N/A    Years of education: N/A     Occupational History    Not on file. Social History Main Topics    Smoking status: Passive Smoke Exposure - Never Smoker    Smokeless tobacco: Never Used    Alcohol use Yes      Comment: occ    Drug use: No    Sexual activity: Not on file     Other Topics Concern    Not on file     Social History Narrative       Prior to Admission Medications   Prescriptions Last Dose Informant Patient Reported? Taking? HYDROmorphone (DILAUDID) 2 mg tablet   No No   Sig: Take 1 Tab by mouth every four (4) hours as needed. Max Daily Amount: 12 mg.   allopurinol (ZYLOPRIM) 300 mg tablet   No No   Sig: Take 1 Tab by mouth daily. amLODIPine (NORVASC) 10 mg tablet   No No   Sig: Take 1 Tab by mouth daily. cyclobenzaprine (FLEXERIL) 10 mg tablet   No No   Sig: Take 1 Tab by mouth two (2) times a day. metFORMIN (GLUCOPHAGE) 500 mg tablet   Yes No   Sig: Take 500 mg by mouth every evening. pantoprazole (PROTONIX) 40 mg tablet   No No   Sig: Take 1 Tab by mouth Daily (before breakfast). quinapril (ACCUPRIL) 40 mg tablet   No No   Sig: Take 1 Tab by mouth daily. simvastatin (ZOCOR) 10 mg tablet   No No   Sig: Take 1 Tab by mouth nightly. therapeutic multivitamin [facility]) tablet   Yes No   Sig: Take 1 Tab by mouth daily.    traMADol (ULTRAM) 50 mg tablet   Yes No   Sig: Take 50 mg by mouth every six (6) hours as needed for Pain.   zolpidem (AMBIEN) 10 mg tablet   No No   Sig: Take 1 Tab by mouth nightly. Max Daily Amount: 10 mg. Facility-Administered Medications: None       Family History   Problem Relation Age of Onset    Breast Cancer Daughter 44    Breast Cancer Daughter 48       Allergies   Allergen Reactions    Oxycodone Itching    Tylenol-Codeine #3 [Acetaminophen-Codeine] Itching     Review of Systems   Constitutional: Negative for chills and fever. HENT: Negative. Lip swelling    Eyes: Negative. Respiratory: Negative. Cardiovascular: Negative. Gastrointestinal: Negative. Genitourinary: Negative. Musculoskeletal: Negative. Skin: Negative for itching and rash. Neurological: Positive for speech change (hoarse). Endo/Heme/Allergies: Negative. Psychiatric/Behavioral: Negative. Physical Exam:       Visit Vitals    /74    Pulse 70    Temp 98.3 °F (36.8 °C)    Resp 18    SpO2 98%       Physical Exam   Constitutional: She is oriented to person, place, and time. She appears well-developed and well-nourished. No distress. HENT:   Mouth/Throat: No uvula swelling. Lip swellingTongue swelling   Eyes: Conjunctivae are normal. No scleral icterus. Neck: Neck supple. Cardiovascular: Normal rate, regular rhythm and normal heart sounds. No murmur heard. Pulmonary/Chest: Effort normal and breath sounds normal. No respiratory distress. She has no wheezes. She has no rales. Abdominal: Soft. Bowel sounds are normal.   Musculoskeletal: She exhibits edema. Neurological: She is alert and oriented to person, place, and time. Skin: Skin is warm. No rash noted. She is diaphoretic. No erythema. No pallor. Psychiatric: She has a normal mood and affect. Ancillary Studies: All lab and imaging reviewed for the past 24 hours.     Recent Results (from the past 24 hour(s))   CBC WITH AUTOMATED DIFF    Collection Time: 03/05/18  4:19 AM   Result Value Ref Range    WBC 6.8 4.6 - 13.2 K/uL    RBC 4.57 4.20 - 5.30 M/uL    HGB 13.2 12.0 - 16.0 g/dL    HCT 39.0 35.0 - 45.0 %    MCV 85.3 74.0 - 97.0 FL    MCH 28.9 24.0 - 34.0 PG    MCHC 33.8 31.0 - 37.0 g/dL    RDW 14.9 (H) 11.6 - 14.5 %    PLATELET 377 402 - 378 K/uL    MPV 11.3 9.2 - 11.8 FL    NEUTROPHILS 30 (L) 40 - 73 %    LYMPHOCYTES 61 (H) 21 - 52 %    MONOCYTES 7 3 - 10 %    EOSINOPHILS 2 0 - 5 %    BASOPHILS 0 0 - 2 %    ABS. NEUTROPHILS 2.0 1.8 - 8.0 K/UL    ABS. LYMPHOCYTES 4.2 (H) 0.9 - 3.6 K/UL    ABS. MONOCYTES 0.5 0.05 - 1.2 K/UL    ABS. EOSINOPHILS 0.1 0.0 - 0.4 K/UL    ABS. BASOPHILS 0.0 0.0 - 0.06 K/UL    DF AUTOMATED     METABOLIC PANEL, BASIC    Collection Time: 03/05/18  4:19 AM   Result Value Ref Range    Sodium 142 136 - 145 mmol/L    Potassium 3.4 (L) 3.5 - 5.5 mmol/L    Chloride 107 100 - 108 mmol/L    CO2 29 21 - 32 mmol/L    Anion gap 6 3.0 - 18 mmol/L    Glucose 123 (H) 74 - 99 mg/dL    BUN 9 7.0 - 18 MG/DL    Creatinine 0.99 0.6 - 1.3 MG/DL    BUN/Creatinine ratio 9 (L) 12 - 20      GFR est AA >60 >60 ml/min/1.73m2    GFR est non-AA 55 (L) >60 ml/min/1.73m2    Calcium 9.3 8.5 - 10.1 MG/DL   PROTHROMBIN TIME + INR    Collection Time: 03/05/18  4:19 AM   Result Value Ref Range    Prothrombin time 13.0 11.5 - 15.2 sec    INR 1.0 0.8 - 1.2     TYPE & SCREEN    Collection Time: 03/05/18  4:40 AM   Result Value Ref Range    Crossmatch Expiration 03/08/2018     ABO/Rh(D) [place] POSITIVE     Antibody screen NEG    FFP, ALLOCATE    Collection Time: 03/05/18  4:45 AM   Result Value Ref Range    Unit number [id number]     Blood component type FP 24h, Thaw     Unit division 00     Status of unit ISSUED     Unit number [id number]     Blood component type FP 24h, Thaw     Unit division 00     Status of unit ALLOCATED        Assessment/Plan     Principal Problem:    Angioedema (3/5/2018)      HTN  DM   GERD    PLAN:    Angioedema   - Likely 2/2 ACEI ON Quinipril.  D/C and switch med on discharge to ARB's  - involving the lips, tongue and throat   - patient s/p solumedrol, benadryl , pepcid  - NPO  - Monitor BP   - FFP 2 units per ED  - Will recommend Intubation if airway is compromised.  Monitor closely in ICU   - Intensivist consulted     HTN   - D/C Quinipril d/t above   - norvasc when able to take po    DM  - SSI     GERD   - Famotidine     DVT Prophylaxis     Full code    Berta Love MD  3/5/2018  5:29 AM

## 2018-03-05 NOTE — IP AVS SNAPSHOT
[address] [address] 
[phone] Patient: Lewis Borja MRN: [id number] :1945 A check maria indicates which time of day the medication should be taken. My Medications CONTINUE taking these medications Instructions Each Dose to Equal  
 Morning Noon Evening Bedtime  
 allopurinol 300 mg tablet Commonly known as:  Mitchgeo Jessy Your last dose was: Your next dose is: Take 1 Tab by mouth daily. 300 mg  
    
   
   
   
  
 amLODIPine 10 mg tablet Commonly known as:  Mariaa Caleb Your last dose was: Your next dose is: Take 1 Tab by mouth daily. 10 mg  
    
   
   
   
  
 celecoxib 200 mg capsule Commonly known as:  CELEBREX Your last dose was: Your next dose is: Take 200 mg by mouth daily. 200 mg  
    
   
   
   
  
 clonazePAM 0.5 mg tablet Commonly known as:  Juan Halon Your last dose was: Your next dose is: Take 0.5 mg by mouth two (2) times a day. 0.5 mg  
    
   
   
   
  
 cyclobenzaprine 10 mg tablet Commonly known as:  FLEXERIL Your last dose was: Your next dose is: Take 1 Tab by mouth two (2) times a day. 10 mg  
    
   
   
   
  
 gabapentin 600 mg tablet Commonly known as:  NEURONTIN Your last dose was: Your next dose is: Take 600 mg by mouth daily. 600 mg HYDROmorphone 2 mg tablet Commonly known as:  DILAUDID Your last dose was: Your next dose is: Take 1 Tab by mouth every four (4) hours as needed. Max Daily Amount: 12 mg.  
 2 mg  
    
   
   
   
  
 metFORMIN 500 mg tablet Commonly known as:  GLUCOPHAGE Your last dose was: Your next dose is: Take 500 mg by mouth every evening. 500 mg  
    
   
   
   
  
 pantoprazole 40 mg tablet Commonly known as:  PROTONIX Your last dose was: Your next dose is: Take 1 Tab by mouth Daily (before breakfast). 40 mg  
    
   
   
   
  
 simvastatin 10 mg tablet Commonly known as:  ZOCOR Your last dose was: Your next dose is: Take 1 Tab by mouth nightly. 10 mg  
    
   
   
   
  
 terbinafine HCl 250 mg tablet Commonly known as:  LAMISIL Your last dose was: Your next dose is: Take 250 mg by mouth daily. 250 mg  
    
   
   
   
  
 therapeutic multivitamin tablet Commonly known as:  [facility] Your last dose was: Your next dose is: Take 1 Tab by mouth daily. 1 Tab  
    
   
   
   
  
 traMADol 50 mg tablet Commonly known as:  ULTRAM  
   
Your last dose was: Your next dose is: Take 50 mg by mouth every six (6) hours as needed for Pain. 50 mg  
    
   
   
   
  
 zolpidem 10 mg tablet Commonly known as:  AMBIEN Your last dose was: Your next dose is: Take 1 Tab by mouth nightly. Max Daily Amount: 10 mg.  
 10 mg  
    
   
   
   
  
  
STOP taking these medications   
 quinapril 40 mg tablet Commonly known as:  ACCUPRIL

## 2018-03-06 VITALS
WEIGHT: 230.6 LBS | HEART RATE: 64 BPM | SYSTOLIC BLOOD PRESSURE: 156 MMHG | OXYGEN SATURATION: 99 % | RESPIRATION RATE: 20 BRPM | DIASTOLIC BLOOD PRESSURE: 88 MMHG | TEMPERATURE: 98.3 F | BODY MASS INDEX: 34.05 KG/M2

## 2018-03-06 LAB
ALBUMIN SERPL-MCNC: 3.5 G/DL (ref 3.4–5)
ALBUMIN/GLOB SERPL: 1.2 {RATIO} (ref 0.8–1.7)
ALP SERPL-CCNC: 61 U/L (ref 45–117)
ALT SERPL-CCNC: 24 U/L (ref 13–56)
ANION GAP SERPL CALC-SCNC: 9 MMOL/L (ref 3–18)
AST SERPL-CCNC: 17 U/L (ref 15–37)
BASOPHILS # BLD: 0 K/UL (ref 0–0.06)
BASOPHILS NFR BLD: 0 % (ref 0–2)
BILIRUB SERPL-MCNC: 0.5 MG/DL (ref 0.2–1)
BLD PROD TYP BPU: NORMAL
BLD PROD TYP BPU: NORMAL
BPU ID: NORMAL
BPU ID: NORMAL
BUN SERPL-MCNC: 11 MG/DL (ref 7–18)
BUN/CREAT SERPL: 13 (ref 12–20)
CALCIUM SERPL-MCNC: 9.5 MG/DL (ref 8.5–10.1)
CHLORIDE SERPL-SCNC: 107 MMOL/L (ref 100–108)
CO2 SERPL-SCNC: 29 MMOL/L (ref 21–32)
CREAT SERPL-MCNC: 0.88 MG/DL (ref 0.6–1.3)
DIFFERENTIAL METHOD BLD: ABNORMAL
EOSINOPHIL # BLD: 0 K/UL (ref 0–0.4)
EOSINOPHIL NFR BLD: 0 % (ref 0–5)
ERYTHROCYTE [DISTWIDTH] IN BLOOD BY AUTOMATED COUNT: 15.3 % (ref 11.6–14.5)
GLOBULIN SER CALC-MCNC: 2.9 G/DL (ref 2–4)
GLUCOSE BLD STRIP.AUTO-MCNC: 142 MG/DL (ref 70–110)
GLUCOSE SERPL-MCNC: 164 MG/DL (ref 74–99)
HCT VFR BLD AUTO: 31.7 % (ref 35–45)
HGB BLD-MCNC: 10.8 G/DL (ref 12–16)
LYMPHOCYTES # BLD: 1.8 K/UL (ref 0.9–3.6)
LYMPHOCYTES NFR BLD: 24 % (ref 21–52)
MCH RBC QN AUTO: 29 PG (ref 24–34)
MCHC RBC AUTO-ENTMCNC: 34.1 G/DL (ref 31–37)
MCV RBC AUTO: 85.2 FL (ref 74–97)
MONOCYTES # BLD: 0.7 K/UL (ref 0.05–1.2)
MONOCYTES NFR BLD: 8 % (ref 3–10)
NEUTS SEG # BLD: 5.3 K/UL (ref 1.8–8)
NEUTS SEG NFR BLD: 68 % (ref 40–73)
PLATELET # BLD AUTO: 154 K/UL (ref 135–420)
PMV BLD AUTO: 11.5 FL (ref 9.2–11.8)
POTASSIUM SERPL-SCNC: 3.6 MMOL/L (ref 3.5–5.5)
PROT SERPL-MCNC: 6.4 G/DL (ref 6.4–8.2)
RBC # BLD AUTO: 3.72 M/UL (ref 4.2–5.3)
SODIUM SERPL-SCNC: 145 MMOL/L (ref 136–145)
STATUS OF UNIT,%ST: NORMAL
STATUS OF UNIT,%ST: NORMAL
UNIT DIVISION, %UDIV: 0
UNIT DIVISION, %UDIV: 0
WBC # BLD AUTO: 7.8 K/UL (ref 4.6–13.2)

## 2018-03-06 PROCEDURE — 36415 COLL VENOUS BLD VENIPUNCTURE: CPT | Performed by: FAMILY MEDICINE

## 2018-03-06 PROCEDURE — 74011000250 HC RX REV CODE- 250: Performed by: FAMILY MEDICINE

## 2018-03-06 PROCEDURE — 74011250637 HC RX REV CODE- 250/637: Performed by: HOSPITALIST

## 2018-03-06 PROCEDURE — 74011250636 HC RX REV CODE- 250/636: Performed by: FAMILY MEDICINE

## 2018-03-06 PROCEDURE — 80053 COMPREHEN METABOLIC PANEL: CPT | Performed by: FAMILY MEDICINE

## 2018-03-06 PROCEDURE — 85025 COMPLETE CBC W/AUTO DIFF WBC: CPT | Performed by: FAMILY MEDICINE

## 2018-03-06 PROCEDURE — 82962 GLUCOSE BLOOD TEST: CPT

## 2018-03-06 RX ORDER — DIPHENHYDRAMINE HCL 25 MG
25 CAPSULE ORAL
Qty: 20 CAP | Refills: 0 | Status: SHIPPED | OUTPATIENT
Start: 2018-03-06 | End: 2018-03-11

## 2018-03-06 RX ORDER — FAMOTIDINE 20 MG/1
20 TABLET, FILM COATED ORAL 2 TIMES DAILY
Qty: 10 TAB | Refills: 0 | Status: SHIPPED | OUTPATIENT
Start: 2018-03-06 | End: 2018-03-11

## 2018-03-06 RX ADMIN — AMLODIPINE BESYLATE 10 MG: 5 TABLET ORAL at 10:06

## 2018-03-06 RX ADMIN — ENOXAPARIN SODIUM 40 MG: 40 INJECTION SUBCUTANEOUS at 10:05

## 2018-03-06 RX ADMIN — FAMOTIDINE 20 MG: 10 INJECTION, SOLUTION INTRAVENOUS at 05:46

## 2018-03-06 NOTE — PROGRESS NOTES
Progress Note      Patient: Aidan Wiggins               Sex: female          DOA: 3/5/2018       YOB: 1945      Age:  71 y.o.        LOS:  LOS: 0 days               Subjective:   Pt developed angioedema from her use of accupril which she had been using for several years . She had tongue and pharyngeal edema on admission but this very quickly subsided . She was seen in the icu and it was felt that she could go to the floor if she was yvonne to eat her lunch . Her voice was hoarse but was improving  . she had no stridor       Objective:      Visit Vitals    /89    Pulse 88    Temp 98.4 °F (36.9 °C)    Resp 18    SpO2 97%    Breastfeeding No       Physical Exam:  Pt is awake an was alert but did have voice hoarseness .   Heart reg rate and rhythm   Lungs good breath sounds with no high pitched sounds   Abdomen soft d nontender   Oniel intact         Lab/Data Reviewed:  BMP:   Lab Results   Component Value Date/Time     03/05/2018 11:10 AM    K 3.8 03/05/2018 11:10 AM     03/05/2018 11:10 AM    CO2 29 03/05/2018 11:10 AM    AGAP 7 03/05/2018 11:10 AM     (H) 03/05/2018 11:10 AM    BUN 9 03/05/2018 11:10 AM    CREA 1.11 03/05/2018 11:10 AM    GFRAA 59 (L) 03/05/2018 11:10 AM    GFRNA 48 (L) 03/05/2018 11:10 AM     CMP:   Lab Results   Component Value Date/Time     03/05/2018 11:10 AM    K 3.8 03/05/2018 11:10 AM     03/05/2018 11:10 AM    CO2 29 03/05/2018 11:10 AM    AGAP 7 03/05/2018 11:10 AM     (H) 03/05/2018 11:10 AM    BUN 9 03/05/2018 11:10 AM    CREA 1.11 03/05/2018 11:10 AM    GFRAA 59 (L) 03/05/2018 11:10 AM    GFRNA 48 (L) 03/05/2018 11:10 AM    CA 9.7 03/05/2018 11:10 AM    ALB 3.9 03/05/2018 11:10 AM    TP 7.0 03/05/2018 11:10 AM    GLOB 3.1 03/05/2018 11:10 AM    AGRAT 1.3 03/05/2018 11:10 AM    SGOT 22 03/05/2018 11:10 AM    ALT 27 03/05/2018 11:10 AM     CBC:   Lab Results   Component Value Date/Time    WBC 6.3 03/05/2018 11:10 AM    HGB 11.8 (L) 03/05/2018 11:10 AM    HCT 34.5 (L) 03/05/2018 11:10 AM     03/05/2018 11:10 AM           Assessment/Plan     Principal Problem:    Angioedema (3/5/2018)now largely resolved   Pt was taking accupril   htn   Diabetes mellitus    Gout .  esophageal reflux     Plan:will move to the floor . will observe closely

## 2018-03-06 NOTE — PROGRESS NOTES
We were asked to see the patient prior to her receiving FFP in the ER. After FFP, there was rapid defervescence of her angioedema. Since she has been moved to the floor, the immediate threat to her airway appears to have passed. We will defer seeing her unless angioedema or other threat to her airway occurs.

## 2018-03-06 NOTE — DISCHARGE SUMMARY
Internal Medicine Discharge Summary        Patient: Aspen Warren    YOB: 1945    Age:  71 y.o. Admit Date: 3/5/2018    Discharge Date: 3/6/2018    LOS:  LOS: 1 day     Discharge To: Home    Consults: None    Admission Diagnoses: Angioedema  Angioedema    Discharge Diagnoses:    Problem List as of 3/6/2018  Date Reviewed: 1/4/2016          Codes Class Noted - Resolved    * (Principal)Angioedema ICD-10-CM: T78. 3XXA  ICD-9-CM: 995.1  3/5/2018 - Present        Primary localized osteoarthrosis, lower leg ICD-10-CM: M17.10  ICD-9-CM: 715.16  6/1/2015 - Present        DM (diabetes mellitus) ([facility].) ICD-10-CM: E11.9  ICD-9-CM: 250.00  6/1/2015 - Present        Gout ICD-10-CM: M10.9  ICD-9-CM: 274.9  6/1/2015 - Present        Hyperlipidemia ICD-10-CM: E78.5  ICD-9-CM: 272.4  6/1/2015 - Present        Hypertension ICD-10-CM: I10  ICD-9-CM: 401.9  6/1/2015 - Present        Obesity (BMI 30.0-34.9) ICD-10-CM: E66.9  ICD-9-CM: 278.00  6/1/2015 - Present              Discharge Condition:  Improved    Procedures: None         HPI: Aspen Warren is a 71 y.o. female who presents with angioedema. Patient reports that she woke up this AM with swollen lips , tongue and hoarse voice. She has never had this symptoms before. She denies trauma, urticaria or insects bite. She is on quanipril for htn and has been taking this for 10 years or so. She reports the swelling is increasing. In the ED she was hemodynamically stable. She was given solumedrol, benadryl,pepecid. FFP 2 UNITS pending. ED called Anesthesiology to possibly intubate . Patient will be admitted for angioedema of Lips, tongue    Hospital Course: The patient was observed in the ICU throughout the day and night and downgraded out of the unit as her angioedema resolved fairly quickly. On the day of discharge, she had no further swelling of her lips or tongue. She had no issues swallowing or breathing.  She was instructed not to take any further ACE inhibitors and to follow up with her PCP within one week. The rest of the patient's chronic conditions were managed appropriately during their admission. They were medically stable at the time of discharge. Visit Vitals    /88    Pulse 64    Temp 98.3 °F (36.8 °C)    Resp 20    Wt 104.6 kg (230 lb 9.6 oz)    SpO2 99%    Breastfeeding No    BMI 34.05 kg/m2       Physical Exam at Discharge:  General Appearance: NAD, conversant  HENT: normocephalic/atraumatic, moist mucus membranes  Lungs: CTA with normal respiratory effort  CV: RRR, no m/r/g  Abdomen: soft, non-tender, normal bowel sounds  Extremities: no cyanosis, no peripheral edema  Neuro: moves all extremities, no focal deficits  Psych: appropriate affect, alert and oriented to person, place and time    Labs Prior to Discharge:  Labs: Results:       Chemistry Recent Labs      03/06/18 0345 03/05/18 1110 03/05/18 0419   GLU  164*  233*  123*   NA  145  142  142   K  3.6  3.8  3.4*   CL  107  106  107   CO2  29  29  29   BUN  11  9  9   CREA  0.88  1.11  0.99   CA  9.5  9.7  9.3   AGAP  9  7  6   BUCR  13  8*  9*   AP  61  69   --    TP  6.4  7.0   --    ALB  3.5  3.9   --    GLOB  2.9  3.1   --    AGRAT  1.2  1.3   --       CBC w/Diff Recent Labs      03/06/18 0345 03/05/18 1110 03/05/18 0419   WBC  7.8  6.3  6.8   RBC  3.72*  4.03*  4.57   HGB  10.8*  11.8*  13.2   HCT  31.7*  34.5*  39.0   PLT  154  175  182   GRANS  68  78*  30*   LYMPH  24  21  61*   EOS  0  0  2      Cardiac Enzymes No results for input(s): CPK, CKND1, ROBEL in the last 72 hours. No lab exists for component: CKRMB, TROIP   Coagulation Recent Labs      03/05/18 0419   PTP  13.0   INR  1.0       Lipid Panel No results found for: CHOL, CHOLPOCT, CHOLX, CHLST, CHOLV, 539660, HDL, LDL, LDLC, DLDLP, 342358, VLDLC, VLDL, TGLX, TRIGL, TRIGP, TGLPOCT, CHHD, CHHDX   BNP No results for input(s): BNPP in the last 72 hours.    Liver Enzymes Recent Labs      03/06/18 0345   TP  6.4   ALB  3.5   AP  61   SGOT  17      Thyroid Studies No results found for: T4, T3U, TSH, TSHEXT         Significant Imaging:  [facility] 3d Edgar W Mammo Bi Screening Incl Cad    Result Date: 2/6/2018  EXAM: Bilateral digital screening mammogram, with CAD, including 3-D breast tomosynthesis INDICATION: Female, 71years old for routine screening mammography. COMPARISON: 1/31/2017, 1/29/2016, and 1/20/2015. TECHNIQUE: Images were obtained with 2-D full-field digital mammography with additional 3-D breast tomosynthesis. The 2-D images were viewed with computer aided detection as an adjunct. _______________ FINDINGS: There are scattered areas of fibroglandular density. No suspicious findings are present. _______________     IMPRESSION: No mammographic evidence of malignancy. Recommend annual screening mammography and clinical breast examination. A result letter will be sent to the patient. The patient will be notified by the [facility] reminder system for scheduling of her annual screening mammogram. BI-RADS Assessment Category 1: negative / Letter 40NM. Discharge Medications:     Discharge Medication List as of 3/6/2018 12:05 PM      CONTINUE these medications which have NOT CHANGED    Details   gabapentin (NEURONTIN) 600 mg tablet Take 600 mg by mouth daily. , Historical Med      clonazePAM (KLONOPIN) 0.5 mg tablet Take 0.5 mg by mouth two (2) times a day., Historical Med      terbinafine HCl (LAMISIL) 250 mg tablet Take 250 mg by mouth daily. , Historical Med      celecoxib (CELEBREX) 200 mg capsule Take 200 mg by mouth daily. , Historical Med      allopurinol (ZYLOPRIM) 300 mg tablet Take 1 Tab by mouth daily. , Print, Disp-60 Tab, R-0      amLODIPine (NORVASC) 10 mg tablet Take 1 Tab by mouth daily. , Print, Disp-60 Tab, R-0      pantoprazole (PROTONIX) 40 mg tablet Take 1 Tab by mouth Daily (before breakfast). , Print, Disp-60 Tab, R-0      simvastatin (ZOCOR) 10 mg tablet Take 1 Tab by mouth nightly. , Print, Disp-60 Tab, R-0      zolpidem (AMBIEN) 10 mg tablet Take 1 Tab by mouth nightly. Max Daily Amount: 10 mg., Print, Disp-60 Tab, R-0      metFORMIN (GLUCOPHAGE) 500 mg tablet Take 500 mg by mouth every evening., Historical Med      traMADol (ULTRAM) 50 mg tablet Take 50 mg by mouth every six (6) hours as needed for Pain., Historical Med      HYDROmorphone (DILAUDID) 2 mg tablet Take 1 Tab by mouth every four (4) hours as needed. Max Daily Amount: 12 mg., Print, Disp-60 Tab, R-0      cyclobenzaprine (FLEXERIL) 10 mg tablet Take 1 Tab by mouth two (2) times a day., Print, Disp-60 Tab, R-0      therapeutic multivitamin (THERAGRAN) tablet Take 1 Tab by mouth daily. , Historical Med         STOP taking these medications       quinapril (ACCUPRIL) 40 mg tablet Comments:   Reason for Stopping:               Activity: Activity as tolerated    Diet: Resume previous diet    Wound Care: None needed    Follow-up:   Please follow up with your PCP within 7 days to discuss your recent hospitalization. Patient to arrange.          Total time spent including time spent on final examination and discharge discussion, discharge documentation and records reviewed and medication reconciliation: > 30 minutes    Satnam Campbell DO  Internal Medicine, Hospitalist  Pager: [phone] [facility]

## 2018-03-06 NOTE — PROGRESS NOTES
2045 -- TRANSFER - IN REPORT:    Lee Gifford  being received from ICU (unit) for routine progression of care      Report consisted of patients Situation, Background, Assessment and   Recommendations(SBAR). Information from the following report(s) SBAR, Intake/Output and MAR was reviewed with the receiving nurse. Opportunity for questions and clarification was provided. Assessment completed upon patients arrival to unit and care assumed. Allergy band applied to wrist. Cardiac monitor applied. 2250 -- PRN sleep medication administered, will continue to monitor pt.    0000 --  Shift reassessment, pt condition unchanged, will continue to monitor. 5994 --  Shift reassessment, pt condition unchanged, will continue to monitor.      0700 -- Bedside, Verbal and Written shift change report given to [address] (oncoming nurse) by Siva Fitzgerald nurse). Report included the following information SBAR, Kardex, Intake/Output, MAR and Recent Results. Skin assessment completed.

## 2018-03-06 NOTE — ROUTINE PROCESS
Bedside and Verbal shift change report given to [address] [facility] (oncoming nurse) by Claudeen Bloodgood (offgoing nurse). Report included the following information SBAR, Kardex, Intake/Output, MAR, Recent Results and Cardiac Rhythm NSR.     1000 Spoke to Dr. Danielle Rodriguez concerning pt's home medications not on the [facility]. Per Dr. Danielle Rodriguez he will evaluate pt for discharge prior to restarting home meds on [facility]. Will continue to monitor. 1230 Pt discharged home. Discharge instructions reviewed. Questions answered and clarifications provided.

## 2018-03-06 NOTE — DISCHARGE INSTRUCTIONS
Allergic Reaction: Care Instructions  Your Care Instructions    An allergic reaction is an excessive response from your immune system to a medicine, chemical, food, insect bite, or other substance. A reaction can range from mild to life-threatening. Some people have a mild rash, hives, and itching or stomach cramps. In severe reactions, swelling of your tongue and throat can close up your airway so that you cannot breathe. Follow-up care is a key part of your treatment and safety. Be sure to make and go to all appointments, and call your doctor if you are having problems. It's also a good idea to know your test results and keep a list of the medicines you take. How can you care for yourself at home? · If you know what caused your allergic reaction, be sure to avoid it. Your allergy may become more severe each time you have a reaction. · Take an over-the-counter antihistamine, such as cetirizine (Zyrtec) or loratadine (Claritin), to treat mild symptoms. Read and follow directions on the label. Some antihistamines can make you feel sleepy. Do not give antihistamines to a child unless you have checked with your doctor first. Mild symptoms include sneezing or an itchy or runny nose; an itchy mouth; a few hives or mild itching; and mild nausea or stomach discomfort. · Do not scratch hives or a rash. Put a cold, moist towel on them or take cool baths to relieve itching. Put ice packs on hives, swelling, or insect stings for 10 to 15 minutes at a time. Put a thin cloth between the ice pack and your skin. Do not take hot baths or showers. They will make the itching worse. · Your doctor may prescribe a shot of epinephrine to carry with you in case you have a severe reaction. Learn how to give yourself the shot and keep it with you at all times. Make sure it is not . · Go to the emergency room every time you have a severe reaction, even if you have used your shot of epinephrine and are feeling better. Symptoms can come back after a shot. · Wear medical alert jewelry that lists your allergies. You can buy this at most drugstores. · If your child has a severe allergy, make sure that his or her teachers, babysitters, coaches, and other caregivers know about the allergy. They should have an epinephrine shot, know how and when to give it, and have a plan to take your child to the hospital.  When should you call for help? Give an epinephrine shot if:  ? · You think you are having a severe allergic reaction. ? · You have symptoms in more than one body area, such as mild nausea and an itchy mouth. ? After giving an epinephrine shot call 911, even if you feel better. ?Call 911 if:  ? · You have symptoms of a severe allergic reaction. These may include:  ¨ Sudden raised, red areas (hives) all over your body. ¨ Swelling of the throat, mouth, lips, or tongue. ¨ Trouble breathing. ¨ Passing out (losing consciousness). Or you may feel very lightheaded or suddenly feel weak, confused, or restless. ? · You have been given an epinephrine shot, even if you feel better. ?Call your doctor now or seek immediate medical care if:  ? · You have symptoms of an allergic reaction, such as:  ¨ A rash or hives (raised, red areas on the skin). ¨ Itching. ¨ Swelling. ¨ Belly pain, nausea, or vomiting. ? Watch closely for changes in your health, and be sure to contact your doctor if:  ? · You do not get better as expected. Where can you learn more? Go to http://alanis-mann.info/. Enter A051 in the search box to learn more about \"Allergic Reaction: Care Instructions. \"  Current as of: September 29, 2016  Content Version: 11.4  © 5003-0203 [employer]. Care instructions adapted under license by [employer] (which disclaims liability or warranty for this information).  If you have questions about a medical condition or this instruction, always ask your healthcare professional. [employer], [facility] disclaims any warranty or liability for your use of this information. Angioedema: Care Instructions  Your Care Instructions    Angioedema is an allergic reaction. It causes swelling and welts in the deep layers of the skin. Angioedema can sometimes occur along with hives. Hives are an allergic reaction in the outer layers of the skin. Angioedema can range from mild to severe. Painful welts can develop on the face. Angioedema can also occur on other parts of the body. In severe cases, the inside of the throat can swell and make it hard to breathe. Many things can cause this condition, including foods, insect bites, and medicines (such as aspirin and some blood pressure medicines). It also can run in families. Sometimes you may know what caused the reaction, but other times you may not know. Follow-up care is a key part of your treatment and safety. Be sure to make and go to all appointments, and call your doctor if you are having problems. It's also a good idea to know your test results and keep a list of the medicines you take. How can you care for yourself at home? · Take your medicines exactly as prescribed. Call your doctor if you think you are having a problem with your medicine. You will get more details on the specific medicines your doctor prescribes. Some medicines used to treat angioedema can make you too sleepy to drive safely. Do not drive if you take medicine that may make you sleepy. · Avoid foods or medicine that may have triggered the swelling. · For comfort:  ¨ Try taking a cool bath. Or place a cool, wet towel on the swollen area. ¨ Avoid hot baths and showers. ¨ Wear loose clothing. · Your doctor may prescribe a shot of epinephrine to carry with you in case you have a severe reaction. Learn how to give yourself the shot and keep it with you at all times. Make sure it has not . When should you call for help?   Give an epinephrine shot if:  ? · You think you are having a severe allergic reaction. ? After giving an epinephrine shot call 911, even if you feel better. ?Call 911 if:  ? · You have symptoms of a severe allergic reaction. These may include:  ¨ Sudden raised, red areas (hives) all over your body. ¨ Swelling of the throat, mouth, lips, or tongue. ¨ Trouble breathing. ¨ Passing out (losing consciousness). Or you may feel very lightheaded or suddenly feel weak, confused, or restless. ? · You have been given an epinephrine shot, even if you feel better. ?Call your doctor now or seek immediate medical care if:  ? · You have symptoms of an allergic reaction, such as:  ¨ A rash or hives (raised, red areas on the skin). ¨ Itching. ¨ Swelling. ¨ Belly pain, nausea, or vomiting. ? Watch closely for changes in your health, and be sure to contact your doctor if:  ? · You do not get better as expected. Where can you learn more? Go to http://alanis-mann.info/. Enter Y734 in the search box to learn more about \"Angioedema: Care Instructions. \"  Current as of: September 29, 2016  Content Version: 11.4  © 4712-6566 [employer]. Care instructions adapted under license by [employer] (which disclaims liability or warranty for this information). If you have questions about a medical condition or this instruction, always ask your healthcare professional. [place] [address] any warranty or liability for your use of this information. DISCHARGE SUMMARY from Nurse    PATIENT INSTRUCTIONS:    After general anesthesia or intravenous sedation, for 24 hours or while taking prescription Narcotics:  · Limit your activities  · Do not drive and operate hazardous machinery  · Do not make important personal or business decisions  · Do  not drink alcoholic beverages  · If you have not urinated within 8 hours after discharge, please contact your surgeon on call.     Report the following to your surgeon:  · Excessive pain, swelling, redness or odor of or around the surgical area  · Temperature over 100.5  · Nausea and vomiting lasting longer than 4 hours or if unable to take medications  · Any signs of decreased circulation or nerve impairment to extremity: change in color, persistent  numbness, tingling, coldness or increase pain  · Any questions    What to do at Home:  Recommended activity: Activity as tolerated    If you experience any of the following symptoms increased swelling or lips or tongue, hoarseness of voice after exposure to food, liquid or environmental factors, please follow up with Primary MD or call 911 if severe. *  Please give a list of your current medications to your Primary Care Provider. *  Please update this list whenever your medications are discontinued, doses are      changed, or new medications (including over-the-counter products) are added. *  Please carry medication information at all times in case of emergency situations. These are general instructions for a healthy lifestyle:    No smoking/ No tobacco products/ Avoid exposure to second hand smoke  Surgeon General's Warning:  Quitting smoking now greatly reduces serious risk to your health. Obesity, smoking, and sedentary lifestyle greatly increases your risk for illness    A healthy diet, regular physical exercise & weight monitoring are important for maintaining a healthy lifestyle    You may be retaining fluid if you have a history of heart failure or if you experience any of the following symptoms:  Weight gain of 3 pounds or more overnight or 5 pounds in a week, increased swelling in our hands or feet or shortness of breath while lying flat in bed. Please call your doctor as soon as you notice any of these symptoms; do not wait until your next office visit.     Recognize signs and symptoms of STROKE:    F-face looks uneven    A-arms unable to move or move unevenly    S-speech slurred or non-existent    T-time-call 911 as soon as signs and symptoms begin-DO NOT go       Back to bed or wait to see if you get better-TIME IS BRAIN. Warning Signs of HEART ATTACK     Call 911 if you have these symptoms:   Chest discomfort. Most heart attacks involve discomfort in the center of the chest that lasts more than a few minutes, or that goes away and comes back. It can feel like uncomfortable pressure, squeezing, fullness, or pain.  Discomfort in other areas of the upper body. Symptoms can include pain or discomfort in one or both arms, the back, neck, jaw, or stomach.  Shortness of breath with or without chest discomfort.  Other signs may include breaking out in a cold sweat, nausea, or lightheadedness. Don't wait more than five minutes to call 911 - MINUTES MATTER! Fast action can save your life. Calling 911 is almost always the fastest way to get lifesaving treatment. Emergency Medical Services staff can begin treatment when they arrive -- up to an hour sooner than if someone gets to the hospital by car. The discharge information has been reviewed with the patient. The patient verbalized understanding. Discharge medications reviewed with the patient and appropriate educational materials and side effects teaching were provided.   ___________________________________________________________________________________________________________________________________

## 2018-03-06 NOTE — PROGRESS NOTES
Problem: Falls - Risk of  Goal: *Absence of Falls  Document Lida Fall Risk and appropriate interventions in the flowsheet.    Outcome: Progressing Towards Goal  Fall Risk Interventions:  Mobility Interventions: Utilize walker, cane, or other assitive device         Medication Interventions: Patient to call before getting OOB, Teach patient to arise slowly

## 2018-03-21 NOTE — ANCILLARY DISCHARGE INSTRUCTIONS
[facility]  Discharge Phone Call       After-Care Discharge Phone Call Questions: no answer     Were you able to get your prescriptions filled? Comment:      [] Yes  []No    Comment if answer is \"No\"   Are you taking your medication(s) as your doctor ordered? Do you understand the purpose of your medications? Comment:    [] Yes  []No    Comment if answer is \"No\"   Are you taking any other medications that are not on the list?  Comment:      [] Yes  []No    Comment if answer is \"Yes\"   Do you have any questions about your medications? Are you aware of potential side effects? Comment:    [] Yes  []No    Comment if answer is \"Yes\"   Did you make your follow-up appointments (if the hospital did not do this before  discharge)? Comment:    [] Yes  []No    Comment if answer is \"No\"   Is there any reason you might not be able to keep your follow-up appointments? Comment:     [] Yes  []No    Comment if answer is \"Yes\"   Do you have any questions about your care plan? Are you aware of what health problems to be alert for? Comment:    [] Yes  []No    Comment if answer is \"Yes\"   Do you have a good understanding of how you should manage your health? Comment:    [] Yes  []No    Comment if answer is \"Yes\"   Do you know which symptoms to watch for that would mean you would need to call your doctor right away? Comment:      [] Yes  []No    Comment if answer is \"No\"   Do you have any questions about the follow up process or any instructions that we have provided? Comment:    [] Yes  []No    Comment if answer is \"Yes\"   Did staff take your preferences into account?         [] Yes  []No    Comment if answer is \"Yes\"

## 2018-04-26 ENCOUNTER — HOSPITAL ENCOUNTER (OUTPATIENT)
Dept: GENERAL RADIOLOGY | Age: 73
Discharge: HOME | End: 2018-04-26
Payer: MEDICARE

## 2018-04-26 DIAGNOSIS — J44.9 COPD (CHRONIC OBSTRUCTIVE PULMONARY DISEASE) (HCC): ICD-10-CM

## 2018-04-26 PROCEDURE — 71046 X-RAY EXAM CHEST 2 VIEWS: CPT

## 2018-05-08 ENCOUNTER — HOSPITAL ENCOUNTER (EMERGENCY)
Age: 73
Discharge: HOME | End: 2018-05-08
Attending: EMERGENCY MEDICINE | Admitting: EMERGENCY MEDICINE
Payer: MEDICARE

## 2018-05-08 VITALS
RESPIRATION RATE: 17 BRPM | WEIGHT: 229 LBS | HEIGHT: 69 IN | SYSTOLIC BLOOD PRESSURE: 147 MMHG | OXYGEN SATURATION: 98 % | BODY MASS INDEX: 33.92 KG/M2 | HEART RATE: 55 BPM | TEMPERATURE: 98.1 F | DIASTOLIC BLOOD PRESSURE: 93 MMHG

## 2018-05-08 DIAGNOSIS — T78.3XXA ANGIOEDEMA, INITIAL ENCOUNTER: Primary | ICD-10-CM

## 2018-05-08 PROCEDURE — 96375 TX/PRO/DX INJ NEW DRUG ADDON: CPT

## 2018-05-08 PROCEDURE — 74011250636 HC RX REV CODE- 250/636: Performed by: EMERGENCY MEDICINE

## 2018-05-08 PROCEDURE — 99284 EMERGENCY DEPT VISIT MOD MDM: CPT

## 2018-05-08 PROCEDURE — 96374 THER/PROPH/DIAG INJ IV PUSH: CPT

## 2018-05-08 PROCEDURE — 74011000250 HC RX REV CODE- 250: Performed by: EMERGENCY MEDICINE

## 2018-05-08 PROCEDURE — 96361 HYDRATE IV INFUSION ADD-ON: CPT

## 2018-05-08 RX ORDER — DIPHENHYDRAMINE HYDROCHLORIDE 50 MG/ML
25 INJECTION, SOLUTION INTRAMUSCULAR; INTRAVENOUS ONCE
Status: COMPLETED | OUTPATIENT
Start: 2018-05-08 | End: 2018-05-08

## 2018-05-08 RX ORDER — FAMOTIDINE 10 MG/ML
20 INJECTION INTRAVENOUS
Status: COMPLETED | OUTPATIENT
Start: 2018-05-08 | End: 2018-05-08

## 2018-05-08 RX ORDER — PREDNISONE 10 MG/1
10 TABLET ORAL SEE ADMIN INSTRUCTIONS
Qty: 21 TAB | Refills: 0 | Status: SHIPPED | OUTPATIENT
Start: 2018-05-08

## 2018-05-08 RX ORDER — DIPHENHYDRAMINE HCL 25 MG
25 CAPSULE ORAL EVERY 6 HOURS
Qty: 16 CAP | Refills: 0 | Status: SHIPPED | OUTPATIENT
Start: 2018-05-08 | End: 2018-05-11

## 2018-05-08 RX ADMIN — DIPHENHYDRAMINE HYDROCHLORIDE 25 MG: 50 INJECTION, SOLUTION INTRAMUSCULAR; INTRAVENOUS at 12:43

## 2018-05-08 RX ADMIN — METHYLPREDNISOLONE SODIUM SUCCINATE 125 MG: 125 INJECTION, POWDER, FOR SOLUTION INTRAMUSCULAR; INTRAVENOUS at 12:43

## 2018-05-08 RX ADMIN — FAMOTIDINE 20 MG: 10 INJECTION INTRAVENOUS at 12:43

## 2018-05-08 RX ADMIN — SODIUM CHLORIDE 1000 ML: 900 INJECTION, SOLUTION INTRAVENOUS at 12:43

## 2018-05-08 NOTE — ED TRIAGE NOTES
Left side of face swollen since waking this morning. Has happened before and was BP med. Good air exchange.

## 2018-05-08 NOTE — ED PROVIDER NOTES
EMERGENCY DEPARTMENT HISTORY AND PHYSICAL EXAM    12:25 PM      Date: 5/8/2018  Patient Name: Felix Kiran    History of Presenting Illness     Chief Complaint   Patient presents with    Allergic Reaction         History Provided By: Patient    Chief Complaint: Facial swelling  Duration:  Hours PTA  Timing:  Acute and improving   Location: L side around lips   Quality: N/A as complaint is not pain  Severity: Moderate  Modifying Factors: none reported  Associated Symptoms: denies any other associated signs or symptoms      Additional History (Context): Felix Kiran is a 71 y.o. female with diabetes and hypertension who presents with L sided and upper lip swelling starting this morning hours PTA. Pt denies swelling in mouth, trouble swallowing, notes she does feel hoarse. Hx prior once before. Pt switched from Lisinopril to Metoprolol about 1 month ago. Pt denies etOH, tobacco use. PCP: Octavia Amaya MD    Current Facility-Administered Medications   Medication Dose Route Frequency Provider Last Rate Last Dose    sodium chloride 0.9 % bolus infusion 1,000 mL  1,000 mL IntraVENous ONCE Ani Vieyra MD 1,000 mL/hr at 05/08/18 1243 1,000 mL at 05/08/18 1243     Current Outpatient Prescriptions   Medication Sig Dispense Refill    gabapentin (NEURONTIN) 600 mg tablet Take 600 mg by mouth daily.  clonazePAM (KLONOPIN) 0.5 mg tablet Take 0.5 mg by mouth two (2) times a day.  terbinafine HCl (LAMISIL) 250 mg tablet Take 250 mg by mouth daily.  celecoxib (CELEBREX) 200 mg capsule Take 200 mg by mouth daily.  HYDROmorphone (DILAUDID) 2 mg tablet Take 1 Tab by mouth every four (4) hours as needed. Max Daily Amount: 12 mg. 60 Tab 0    allopurinol (ZYLOPRIM) 300 mg tablet Take 1 Tab by mouth daily. 60 Tab 0    amLODIPine (NORVASC) 10 mg tablet Take 1 Tab by mouth daily. 60 Tab 0    cyclobenzaprine (FLEXERIL) 10 mg tablet Take 1 Tab by mouth two (2) times a day.  60 Tab 0    pantoprazole (PROTONIX) 40 mg tablet Take 1 Tab by mouth Daily (before breakfast). 60 Tab 0    simvastatin (ZOCOR) 10 mg tablet Take 1 Tab by mouth nightly. 60 Tab 0    zolpidem (AMBIEN) 10 mg tablet Take 1 Tab by mouth nightly. Max Daily Amount: 10 mg. 60 Tab 0    therapeutic multivitamin (THERAGRAN) tablet Take 1 Tab by mouth daily.  metFORMIN (GLUCOPHAGE) 500 mg tablet Take 500 mg by mouth every evening.  traMADol (ULTRAM) 50 mg tablet Take 50 mg by mouth every six (6) hours as needed for Pain. Past History     Past Medical History:  Past Medical History:   Diagnosis Date    Arthritis     Diabetes ([address]     PRE DIABETES    Enlarged thyroid     GERD (gastroesophageal reflux disease)     Gout     Hypertension        Past Surgical History:  Past Surgical History:   Procedure Laterality Date    HX HYSTERECTOMY  1985    HX KNEE REPLACEMENT Left June 6073    complicated with patellar pain    HX ORTHOPAEDIC      right hand, left hand       Family History:  Family History   Problem Relation Age of Onset    Breast Cancer Daughter 44    Breast Cancer Daughter 48       Social History:  Social History   Substance Use Topics    Smoking status: Passive Smoke Exposure - Never Smoker    Smokeless tobacco: Never Used    Alcohol use Yes      Comment: occ       Allergies: Allergies   Allergen Reactions    Ace Inhibitors Angioedema    Oxycodone Itching    Tylenol-Codeine #3 [Acetaminophen-Codeine] Itching         Review of Systems       Review of Systems   HENT: Positive for facial swelling (L lip). Negative for trouble swallowing. Respiratory: Negative for choking and shortness of breath. All other systems reviewed and are negative.         Physical Exam     Visit Vitals    BP (!) 147/93 (BP 1 Location: Right arm, BP Patient Position: At rest)    Pulse (!) 59    Temp 98.1 °F (36.7 °C)    Resp 17    Ht 5' 9\" (1.753 m)    Wt 103.9 kg (229 lb)    SpO2 100%    BMI 33.82 kg/m2         Physical Exam Constitutional: She is oriented to person, place, and time. She appears well-developed and well-nourished. No distress. HENT:   Head: Normocephalic and atraumatic. Mouth/Throat: Oropharynx is clear and moist.   Decreased L nasolabial fold, increased edema upper lip  No trouble swallowing, no drooling. Eyes: Conjunctivae and EOM are normal. Pupils are equal, round, and reactive to light. No scleral icterus. Neck: Normal range of motion. Neck supple. Cardiovascular: Normal rate, regular rhythm and normal heart sounds. No murmur heard. Pulmonary/Chest: Effort normal and breath sounds normal. No respiratory distress. No resp distress   Abdominal: Soft. Bowel sounds are normal. She exhibits no distension. There is no tenderness. Musculoskeletal: She exhibits no edema. Lymphadenopathy:     She has no cervical adenopathy. Neurological: She is alert and oriented to person, place, and time. Coordination normal.   Skin: Skin is warm and dry. No rash noted. Psychiatric: She has a normal mood and affect. Her behavior is normal.   Nursing note and vitals reviewed. Diagnostic Study Results     Labs -  No results found for this or any previous visit (from the past 12 hour(s)). Radiologic Studies -   No orders to display         Medical Decision Making   I am the first provider for this patient. I reviewed the vital signs, available nursing notes, past medical history, past surgical history, family history and social history. Vital Signs-Reviewed the patient's vital signs. Pulse Oximetry Analysis -  100% on room air (Interpretation) nonhypoxic     Cardiac Monitor:  Rate: 59    Records Reviewed: Nursing Notes (Time of Review: 12:25 PM)    ED Course: Progress Notes, Reevaluation, and Consults:    13:40 Slight improvement, certainly no increase in edema. Pt states she did start new medication for cholestrol 3 days ago. Instructed to stop new medication.         Provider Notes (Medical Decision Making):   MDM  Number of Diagnoses or Management Options  Angioedema, initial encounter:   Diagnosis management comments: Mild L upper lip angioedema no resp compromise noted unclear if related to Bp use states improved since this am        Amount and/or Complexity of Data Reviewed  Clinical lab tests: ordered and reviewed            Diagnosis     Clinical Impression:   1. Angioedema, initial encounter        Disposition: Discharge     Follow-up Information     Follow up With Details Comments Contact Info    [facility] EMERGENCY DEPT  As needed, If symptoms worsen [address] [place] [address]    Lisa Montes MD Schedule an appointment as soon as possible for a visit for ED follow up appointment  [address] [address]  [phone]             Patient's Medications   Start Taking    No medications on file   Continue Taking    ALLOPURINOL (ZYLOPRIM) 300 MG TABLET    Take 1 Tab by mouth daily. AMLODIPINE (NORVASC) 10 MG TABLET    Take 1 Tab by mouth daily. CELECOXIB (CELEBREX) 200 MG CAPSULE    Take 200 mg by mouth daily. CLONAZEPAM (KLONOPIN) 0.5 MG TABLET    Take 0.5 mg by mouth two (2) times a day. CYCLOBENZAPRINE (FLEXERIL) 10 MG TABLET    Take 1 Tab by mouth two (2) times a day. GABAPENTIN (NEURONTIN) 600 MG TABLET    Take 600 mg by mouth daily. HYDROMORPHONE (DILAUDID) 2 MG TABLET    Take 1 Tab by mouth every four (4) hours as needed. Max Daily Amount: 12 mg. METFORMIN (GLUCOPHAGE) 500 MG TABLET    Take 500 mg by mouth every evening. PANTOPRAZOLE (PROTONIX) 40 MG TABLET    Take 1 Tab by mouth Daily (before breakfast). SIMVASTATIN (ZOCOR) 10 MG TABLET    Take 1 Tab by mouth nightly. TERBINAFINE HCL (LAMISIL) 250 MG TABLET    Take 250 mg by mouth daily. THERAPEUTIC MULTIVITAMIN (THERAGRAN) TABLET    Take 1 Tab by mouth daily. TRAMADOL (ULTRAM) 50 MG TABLET    Take 50 mg by mouth every six (6) hours as needed for Pain.     ZOLPIDEM (AMBIEN) 10 MG TABLET    Take 1 Tab by mouth nightly. Max Daily Amount: 10 mg. These Medications have changed    No medications on file   Stop Taking    No medications on file     _______________________________    Attestations:  [address] [place] [address] acting as a scribe for and in the presence of Alli Blackmon MD      May 08, 2018 at 1:41 PM       Provider Attestation:      I personally performed the services described in the documentation, reviewed the documentation, as recorded by the scribe in my presence, and it accurately and completely records my words and actions.  May 08, 2018 at 1:41 PM - Alli Blackmon MD    _______________________________

## 2018-05-08 NOTE — DISCHARGE INSTRUCTIONS
Angioedema: Care Instructions  Your Care Instructions    Angioedema is an allergic reaction. It causes swelling and welts in the deep layers of the skin. Angioedema can sometimes occur along with hives. Hives are an allergic reaction in the outer layers of the skin. Angioedema can range from mild to severe. Painful welts can develop on the face. Angioedema can also occur on other parts of the body. In severe cases, the inside of the throat can swell and make it hard to breathe. Many things can cause this condition, including foods, insect bites, and medicines (such as aspirin and some blood pressure medicines). It also can run in families. Sometimes you may know what caused the reaction, but other times you may not know. Follow-up care is a key part of your treatment and safety. Be sure to make and go to all appointments, and call your doctor if you are having problems. It's also a good idea to know your test results and keep a list of the medicines you take. How can you care for yourself at home? · Take your medicines exactly as prescribed. Call your doctor if you think you are having a problem with your medicine. You will get more details on the specific medicines your doctor prescribes. Some medicines used to treat angioedema can make you too sleepy to drive safely. Do not drive if you take medicine that may make you sleepy. · Avoid foods or medicine that may have triggered the swelling. · For comfort:  ¨ Try taking a cool bath. Or place a cool, wet towel on the swollen area. ¨ Avoid hot baths and showers. ¨ Wear loose clothing. · Your doctor may prescribe a shot of epinephrine to carry with you in case you have a severe reaction. Learn how to give yourself the shot and keep it with you at all times. Make sure it has not . When should you call for help? Give an epinephrine shot if:  ? · You think you are having a severe allergic reaction. ? After giving an epinephrine shot call 911, even if you feel better. ?Call 911 if:  ? · You have symptoms of a severe allergic reaction. These may include:  ¨ Sudden raised, red areas (hives) all over your body. ¨ Swelling of the throat, mouth, lips, or tongue. ¨ Trouble breathing. ¨ Passing out (losing consciousness). Or you may feel very lightheaded or suddenly feel weak, confused, or restless. ? · You have been given an epinephrine shot, even if you feel better. ?Call your doctor now or seek immediate medical care if:  ? · You have symptoms of an allergic reaction, such as:  ¨ A rash or hives (raised, red areas on the skin). ¨ Itching. ¨ Swelling. ¨ Belly pain, nausea, or vomiting. ? Watch closely for changes in your health, and be sure to contact your doctor if:  ? · You do not get better as expected. Where can you learn more? Go to http://alanis-mann.info/. Enter N200 in the search box to learn more about \"Angioedema: Care Instructions. \"  Current as of: September 29, 2016  Content Version: 11.4  © 1820-0008 [employer]. Care instructions adapted under license by [employer] (which disclaims liability or warranty for this information). If you have questions about a medical condition or this instruction, always ask your healthcare professional. [address] any warranty or liability for your use of this information. Stop the new medication for your cholesterol.

## 2019-02-08 ENCOUNTER — HOSPITAL ENCOUNTER (OUTPATIENT)
Dept: MAMMOGRAPHY | Age: 74
Discharge: HOME | End: 2019-02-08
Attending: FAMILY MEDICINE
Payer: MEDICARE

## 2019-02-08 DIAGNOSIS — Z12.31 VISIT FOR SCREENING MAMMOGRAM: ICD-10-CM

## 2019-02-08 PROCEDURE — 77063 BREAST TOMOSYNTHESIS BI: CPT

## 2019-10-02 ENCOUNTER — HOSPITAL ENCOUNTER (OUTPATIENT)
Dept: ULTRASOUND IMAGING | Age: 74
Discharge: HOME | End: 2019-10-02
Payer: MEDICARE

## 2019-10-02 DIAGNOSIS — E04.2 NONTOXIC MULTINODULAR GOITER: ICD-10-CM

## 2019-10-02 PROCEDURE — 76536 US EXAM OF HEAD AND NECK: CPT

## 2020-06-23 ENCOUNTER — HOSPITAL ENCOUNTER (OUTPATIENT)
Dept: MAMMOGRAPHY | Age: 75
Discharge: HOME | End: 2020-06-23
Attending: FAMILY MEDICINE
Payer: MEDICARE

## 2020-06-23 DIAGNOSIS — Z12.31 VISIT FOR SCREENING MAMMOGRAM: ICD-10-CM

## 2020-06-23 PROCEDURE — 77063 BREAST TOMOSYNTHESIS BI: CPT

## 2021-06-29 ENCOUNTER — HOSPITAL ENCOUNTER (OUTPATIENT)
Dept: WOMENS IMAGING | Age: 76
Discharge: HOME | End: 2021-06-29
Attending: INTERNAL MEDICINE
Payer: MEDICARE

## 2021-06-29 DIAGNOSIS — Z12.31 ENCOUNTER FOR SCREENING MAMMOGRAM FOR BREAST CANCER: ICD-10-CM

## 2021-06-29 PROCEDURE — 77063 BREAST TOMOSYNTHESIS BI: CPT

## 2022-03-19 PROBLEM — T78.3XXA ANGIOEDEMA: Status: ACTIVE | Noted: 2018-03-05

## 2022-06-30 ENCOUNTER — HOSPITAL ENCOUNTER (OUTPATIENT)
Dept: WOMENS IMAGING | Age: 77
Discharge: HOME | End: 2022-06-30
Attending: FAMILY MEDICINE
Payer: MEDICARE

## 2022-06-30 DIAGNOSIS — Z12.31 ENCOUNTER FOR SCREENING MAMMOGRAM FOR BREAST CANCER: ICD-10-CM

## 2022-06-30 PROCEDURE — 77063 BREAST TOMOSYNTHESIS BI: CPT

## 2022-08-09 ENCOUNTER — HOSPITAL ENCOUNTER (OUTPATIENT)
Dept: ULTRASOUND IMAGING | Age: 77
Discharge: HOME | End: 2022-08-09
Attending: FAMILY MEDICINE
Payer: MEDICARE

## 2022-08-09 ENCOUNTER — HOSPITAL ENCOUNTER (OUTPATIENT)
Dept: WOMENS IMAGING | Age: 77
Discharge: HOME | End: 2022-08-09
Attending: FAMILY MEDICINE
Payer: MEDICARE

## 2022-08-09 DIAGNOSIS — R92.8 ABNORMAL MAMMOGRAM: ICD-10-CM

## 2022-08-09 DIAGNOSIS — R92.8 ABNORMAL FINDING ON BREAST IMAGING: ICD-10-CM

## 2022-08-09 PROCEDURE — 77061 BREAST TOMOSYNTHESIS UNI: CPT

## 2022-08-09 PROCEDURE — 76642 ULTRASOUND BREAST LIMITED: CPT

## 2022-08-25 ENCOUNTER — HOSPITAL ENCOUNTER (OUTPATIENT)
Dept: WOMENS IMAGING | Age: 77
Discharge: HOME | End: 2022-08-25
Attending: FAMILY MEDICINE
Payer: MEDICARE

## 2022-08-25 ENCOUNTER — HOSPITAL ENCOUNTER (OUTPATIENT)
Dept: ULTRASOUND IMAGING | Age: 77
Discharge: HOME | End: 2022-08-25
Attending: FAMILY MEDICINE
Payer: MEDICARE

## 2022-08-25 DIAGNOSIS — N63.10 BREAST MASS, RIGHT: ICD-10-CM

## 2022-08-25 PROCEDURE — 88360 TUMOR IMMUNOHISTOCHEM/MANUAL: CPT

## 2022-08-25 PROCEDURE — 19083 BX BREAST 1ST LESION US IMAG: CPT

## 2022-08-25 PROCEDURE — 74011000250 HC RX REV CODE- 250

## 2022-08-25 PROCEDURE — 77065 DX MAMMO INCL CAD UNI: CPT

## 2022-08-25 PROCEDURE — 88305 TISSUE EXAM BY PATHOLOGIST: CPT

## 2022-08-25 RX ORDER — LIDOCAINE HYDROCHLORIDE 10 MG/ML
5 INJECTION, SOLUTION EPIDURAL; INFILTRATION; INTRACAUDAL; PERINEURAL
Status: COMPLETED | OUTPATIENT
Start: 2022-08-25 | End: 2022-08-25

## 2022-08-25 RX ADMIN — LIDOCAINE HYDROCHLORIDE 15 ML: 10; .005 INJECTION, SOLUTION EPIDURAL; INFILTRATION; INTRACAUDAL; PERINEURAL at 11:49

## 2022-08-25 RX ADMIN — LIDOCAINE HYDROCHLORIDE 5 ML: 10 INJECTION, SOLUTION EPIDURAL; INFILTRATION; INTRACAUDAL; PERINEURAL at 11:01

## 2022-08-25 NOTE — PROGRESS NOTES
Patient arrived for Right Breast Ultrasound Guided Breast Biopsy with Vision Clip Placement. Patient arrived in [facility] ambulatory, alert and oriented. Patient tolerated Biopsy well without complaint. Specimens were obtained and placed in labeled formalin container for pathology. Direct pressure was applied to incision site, bleeding controlled, and steri strips were applied prior to post mammogram images for confirmation of clip placement. Clean dry guaze and ice pack applied and bra on. Reviewed post breast biopsy instructions with patient. Patient verbalizes understanding and written copy given to patient.   Patient was discharged at 1145 am

## 2022-09-26 ENCOUNTER — TRANSCRIBE ORDER (OUTPATIENT)
Dept: SCHEDULING | Age: 77
End: 2022-09-26

## 2022-09-26 DIAGNOSIS — Z78.0 MENOPAUSE: Primary | ICD-10-CM

## 2022-10-27 ENCOUNTER — HOSPITAL ENCOUNTER (OUTPATIENT)
Dept: BONE DENSITY | Age: 77
Discharge: HOME | End: 2022-10-27
Attending: STUDENT IN AN ORGANIZED HEALTH CARE EDUCATION/TRAINING PROGRAM
Payer: MEDICARE

## 2022-10-27 DIAGNOSIS — Z78.0 MENOPAUSE: ICD-10-CM

## 2022-10-27 PROCEDURE — 77080 DXA BONE DENSITY AXIAL: CPT
